# Patient Record
Sex: FEMALE | Race: WHITE | Employment: OTHER | URBAN - METROPOLITAN AREA
[De-identification: names, ages, dates, MRNs, and addresses within clinical notes are randomized per-mention and may not be internally consistent; named-entity substitution may affect disease eponyms.]

---

## 2018-04-03 ENCOUNTER — HOSPITAL ENCOUNTER (OUTPATIENT)
Dept: OTHER | Age: 58
Discharge: HOME | End: 2018-04-03
Attending: NURSE PRACTITIONER | Admitting: NURSE PRACTITIONER

## 2018-04-03 DIAGNOSIS — M25.551 BILATERAL HIP PAIN: ICD-10-CM

## 2018-04-03 DIAGNOSIS — M25.552 BILATERAL HIP PAIN: ICD-10-CM

## 2018-06-19 ENCOUNTER — OFFICE VISIT (OUTPATIENT)
Dept: ORTHOPEDIC SURGERY | Age: 58
End: 2018-06-19

## 2018-06-19 VITALS
HEIGHT: 64 IN | HEART RATE: 81 BPM | BODY MASS INDEX: 36.7 KG/M2 | DIASTOLIC BLOOD PRESSURE: 98 MMHG | SYSTOLIC BLOOD PRESSURE: 104 MMHG | WEIGHT: 215 LBS

## 2018-06-19 DIAGNOSIS — M54.9 BACK ARTHRALGIA: Primary | ICD-10-CM

## 2018-06-19 PROCEDURE — 4004F PT TOBACCO SCREEN RCVD TLK: CPT | Performed by: ORTHOPAEDIC SURGERY

## 2018-06-19 PROCEDURE — 3017F COLORECTAL CA SCREEN DOC REV: CPT | Performed by: ORTHOPAEDIC SURGERY

## 2018-06-19 PROCEDURE — G8427 DOCREV CUR MEDS BY ELIG CLIN: HCPCS | Performed by: ORTHOPAEDIC SURGERY

## 2018-06-19 PROCEDURE — G8417 CALC BMI ABV UP PARAM F/U: HCPCS | Performed by: ORTHOPAEDIC SURGERY

## 2018-06-19 PROCEDURE — 99203 OFFICE O/P NEW LOW 30 MIN: CPT | Performed by: ORTHOPAEDIC SURGERY

## 2018-06-19 RX ORDER — LORATADINE 10 MG/1
10 CAPSULE, LIQUID FILLED ORAL DAILY
COMMUNITY

## 2018-06-19 RX ORDER — DOCUSATE SODIUM 100 MG/1
100 CAPSULE, LIQUID FILLED ORAL NIGHTLY
COMMUNITY

## 2018-06-19 RX ORDER — LISINOPRIL AND HYDROCHLOROTHIAZIDE 12.5; 1 MG/1; MG/1
1 TABLET ORAL DAILY
COMMUNITY
End: 2021-02-10

## 2018-06-19 RX ORDER — ATORVASTATIN CALCIUM 40 MG/1
40 TABLET, FILM COATED ORAL NIGHTLY
COMMUNITY

## 2018-06-19 RX ORDER — CYCLOBENZAPRINE HCL 10 MG
10 TABLET ORAL 3 TIMES DAILY PRN
Qty: 60 TABLET | Refills: 0 | Status: SHIPPED | OUTPATIENT
Start: 2018-06-19 | End: 2018-06-29

## 2018-06-28 ENCOUNTER — HOSPITAL ENCOUNTER (OUTPATIENT)
Dept: PHYSICAL THERAPY | Age: 58
Discharge: HOME | End: 2018-06-30
Admitting: ORTHOPAEDIC SURGERY

## 2018-06-28 NOTE — FLOWSHEET NOTE
Therapy:   n/a    Therapeutic Activity:   n/a     Gait: see eval    Neuromuscular Re-Education:n/a      Canalith Repositioning Procedure:n/a    Manual Therapy: x 15 minutes  Shotgun technique x 5 reps x 5 second hold  MET R sacral fixation in sidelying x 5 reps x 5 second hold  Manual traction    Modalities:  n/a    Functional Outcome Measure:   Date recorded: 6/28/2018  Measure used: Mod oswestry  Score:  21/50  = 42%   Disability:  42%       Assessment/Treatment/Activity Tolerance:   Good tolerance to manual treatment and there-ex. GCode:  Changing and Maintaining Body Position Current Status (): At least 40 percent but less than 60 percent impaired, limited or restricted  Patients response to treatment:   [x] Patient tolerated treatment well [] Patient limited by fatigue   [] Patient limited by pain [] Patient limited by other medical complications   [] Other:     Goals:   Progress towards goals:  n/a  GCode:   Changing and Maintaining Body Position Goal Status (): At least 20 percent but less than 40 percent impaired, limited or restricted  Short Term Goals (  2-3  weeks) Long Term Goals ( 4-6  weeks)   1). Establish HEP 1). Independent with HEP   2). Decrease pain 0-5/10  2). Decrease pain to 0-3/10 with centralization of pain to low back   3). Present to clinic for 2 consecutive sessions with neutral pelvic-sacral alignment 3). Tolerate sitting for > 1 hr without significant increase in pain   4). 4). Improve mod oswestry <20% disability   5). 5).   6). 6).          Prognosis: [x] Good [] Fair  [] Poor    Patient Requires Follow-up:  [x] Yes  [] No    Plan: [] Continue per plan of care [] Alter current plan (see comments)   [x] Plan of care initiated [] Hold pending MD visit [] Discharge    Timed Code Treatment Minutes:  38    Total Treatment Minutes:  68      Electronically signed by:  Vazquez Tucker, [id number]
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
## 2018-07-01 ENCOUNTER — HOSPITAL ENCOUNTER (OUTPATIENT)
Dept: OTHER | Age: 58
Discharge: HOME | End: 2018-07-01
Attending: ORTHOPAEDIC SURGERY | Admitting: ORTHOPAEDIC SURGERY

## 2018-07-02 ENCOUNTER — HOSPITAL ENCOUNTER (OUTPATIENT)
Dept: PHYSICAL THERAPY | Age: 58
Discharge: HOME | End: 2018-07-03

## 2018-07-02 NOTE — FLOWSHEET NOTE
Outpatient Physical Therapy     [x] Daily Treatment Note   [] Progress Note   [] Discharge Note    Date:  7/2/2018    Patient Name:  Cristhian Pena        YOB: 1960    Medical Diagnosis:  Back arthralgia        ICD 10:  M54.9    Treatment Diagnosis:  Sacral fixation, decreased flexibility, hip weakness, gait abnormality, postural dysfunction    Onset Date:  2016      Referral Date:6/19/2018     Referring Physician:  Dr. Catarino Estrada MD        Visits Allowed/Insurance/Certification Information:  Abby Sanchez    Restrictions/Precautions:  No specific     Plan of care signed (Y/N):   n/a    Progress Note covers period from (if applicable):    [x]  NA    [] From          To           Next Progress Note due:   7/19/2018    Visit# / total visits:  2/8-12    Plan for Next Session:  Re-assess pelvic-sacral dysfunction with manual techniques as needed, manual techniques for thoracic spine as needed, postural stretching, flexion/extension response, piriformis stretches, balance exercises, hip strengthening, decompression techniques    Subjective:  Pain remains unchanged  Plans to start back on lipitor tomorrow, tart cherry and tomaric tomorrow  Reports transitional sideways movements cause more pain  Exercises going well however with increased difficulty with hamstring stretch     Pain level: 4/10 walking into clinic, 6-7/10 walking dog this morning    AT EVAL: Patient reports 3-4 /10 pain at rest, 6-7 /10 pain at worse     Objective:       Exercises:    Exercises in bold performed in department today. Items not bolded are carried forward from prior visits for continuity of the record.   Exercise/Equipment Resistance/Repetitions Other comments     Knee rocks X 10 reps Handout given, increase to 3x10 as tolerated     BKTC X 10 reps with 10 second hold \"     Hamstring stretch X 30 seconds x 3 Altered method to sitting EOB     Glut set X 10 reps with 5 second hold \"     bridges 1x 10 reps Handout given                                                                                                          Therapeutic Exercise/Home Exercise Program:  X 15minutes. See above, HEP reviewed and reviewed as above    Group Therapy:   n/a    Therapeutic Activity:   n/a     Gait: see eval    Neuromuscular Re-Education:n/a      Canalith Repositioning Procedure:n/a    Manual Therapy: x 15 minutes  (+) sacral thrusts  (+) sacral sulci test with sidebend  Shotgun technique x 5 reps x 5 second hold  MET R sacral fixation in sidelying (L) x 5 reps x 5 second hold (resisted hip abduction with lumbosacral rotation)   MET R sacral fixation in sidelying (R) x 5 reps x 5 second hold (resisted hip adduction)  Manual traction    Modalities:  n/a    Functional Outcome Measure:   Date recorded: 6/28/2018  Measure used: Mod oswestry  Score:  21/50  = 42%   Disability:  42%       Assessment/Treatment/Activity Tolerance:   Good tolerance to manual treatment and there-ex. GCode:  Changing and Maintaining Body Position Current Status (): At least 40 percent but less than 60 percent impaired, limited or restricted  Patients response to treatment:   [x] Patient tolerated treatment well [] Patient limited by fatigue   [] Patient limited by pain [] Patient limited by other medical complications   [] Other:     Goals:   Progress towards goals:  n/a  GCode:   Changing and Maintaining Body Position Goal Status (): At least 20 percent but less than 40 percent impaired, limited or restricted  Short Term Goals (  2-3  weeks) Long Term Goals ( 4-6  weeks)   1). Establish HEP 1). Independent with HEP   2). Decrease pain 0-5/10  2). Decrease pain to 0-3/10 with centralization of pain to low back   3). Present to clinic for 2 consecutive sessions with neutral pelvic-sacral alignment 3). Tolerate sitting for > 1 hr without significant increase in pain   4). 4). Improve mod oswestry <20% disability   5).  5).   6). 6).

## 2018-07-05 ENCOUNTER — HOSPITAL ENCOUNTER (OUTPATIENT)
Dept: PHYSICAL THERAPY | Age: 58
Discharge: HOME | End: 2018-07-06

## 2018-07-09 ENCOUNTER — HOSPITAL ENCOUNTER (OUTPATIENT)
Dept: PHYSICAL THERAPY | Age: 58
Discharge: HOME | End: 2018-07-10

## 2018-07-09 NOTE — FLOWSHEET NOTE
Outpatient Physical Therapy     [x] Daily Treatment Note   [] Progress Note   [] Discharge Note    Date:  7/9/2018    Patient Name:  Taisha Mccollum        YOB: 1960    Medical Diagnosis:  Back arthralgia        ICD 10:  M54.9    Treatment Diagnosis:  Sacral fixation, decreased flexibility, hip weakness, gait abnormality, postural dysfunction    Onset Date:  2016      Referral Date:6/19/2018     Referring Physician:  Dr. Chloe Simpson MD        Visits Allowed/Insurance/Certification Information:  Franklin Wood Adv    Restrictions/Precautions:  No specific     Plan of care signed (Y/N):   n/a    Progress Note covers period from (if applicable):    [x]  NA    [] From          To           Next Progress Note due:   7/19/2018    Visit# / total visits:  4/8-12    Plan for Next Session:  Re-assess pelvic-sacral dysfunction with manual techniques as needed, manual techniques for thoracic spine as needed, postural stretching, flexion/extension response, piriformis stretches, balance exercises, hip strengthening, decompression techniques    Subjective:  Pt reports no significant increase or decrease in pain immediately after last session  However yesterday was in pool with more activity. Noted significantly increase pain when attempting to get up   Continued increased pain over last 24 hr with rating as high of 9/20 when walking dog this morning  Pt reports intermittent paraesthesias through RUE to elbow \" a couple times\"  Pt noted tremors 2-3 months in L hand  Noted some increased imbalance, knocking into furniture especially when hurrying to bathroom at night   Pt reports improved ability to lay supine at night (prior to recent flareup over last 24 hrs)    Pain level: 7/10 walking into clinic, 9/10 walking dog this morning , 6/10 at end of session  AT [facility]: Patient reports 3-4 /10 pain at rest, 6-7 /10 pain at worse     Objective:       Exercises:    Exercises in bold performed in department today.

## 2018-07-12 ENCOUNTER — HOSPITAL ENCOUNTER (OUTPATIENT)
Dept: PHYSICAL THERAPY | Age: 58
Discharge: HOME | End: 2018-07-13

## 2018-07-12 NOTE — FLOWSHEET NOTE
Outpatient Physical Therapy     [x] Daily Treatment Note   [] Progress Note   [] Discharge Note    Date:  7/12/2018    Patient Name:  Nithya Bobby        YOB: 1960    Medical Diagnosis:  Back arthralgia        ICD 10:  M54.9    Treatment Diagnosis:  Sacral fixation, decreased flexibility, hip weakness, gait abnormality, postural dysfunction    Onset Date:  2016      Referral Date:6/19/2018     Referring Physician:  Dr. Kel Mosquera MD        Visits Allowed/Insurance/Certification Information:  Massimo Kumar Adv    Restrictions/Precautions:  No specific     Plan of care signed (Y/N):   n/a    Progress Note covers period from (if applicable):    [x]  NA    [] From          To           Next Progress Note due:   7/19/2018    Visit# / total visits:  5/8-12    Plan for Next Session:  Re-assess pelvic-sacral dysfunction with manual techniques as needed, manual techniques for thoracic spine as needed, postural stretching, flexion/extension response, piriformis stretches, balance exercises, hip strengthening, decompression techniques    Subjective:  Pt reports she is better today than on Monday. Report prone prop does seem to help    Pain level: 4-5/10 walking into clinic, 6-7/10 walking dog this morning,   6/10 at end of session  AT [facility]: Patient reports 3-4 /10 pain at rest, 6-7 /10 pain at worse     Objective:       Exercises:    Exercises in bold performed in department today. Items not bolded are carried forward from prior visits for continuity of the record.   Exercise/Equipment Resistance/Repetitions Other comments   Knee rocks X 10 reps Handout given, increase to 3x10 as tolerated   BKTC X 10 reps with 10 second hold \"   Hamstring stretch X 30 seconds x 3 Pt reports she was inst to stop this ex  Altered method to sitting EOB   Glut set 2x10 reps with 5-10 second hold \"   TA 2x10    bridges 2x10 reps Handout given   Seated piriformis stretch 30 sec x 3 wang    Shotgun ex 5x5 sec Discontinued

## 2018-07-16 ENCOUNTER — HOSPITAL ENCOUNTER (OUTPATIENT)
Dept: PHYSICAL THERAPY | Age: 58
Setting detail: THERAPIES SERIES
Discharge: HOME | End: 2018-07-16
Payer: MEDICARE

## 2018-07-16 PROCEDURE — 97110 THERAPEUTIC EXERCISES: CPT

## 2018-07-16 PROCEDURE — 97140 MANUAL THERAPY 1/> REGIONS: CPT

## 2018-07-19 ENCOUNTER — APPOINTMENT (OUTPATIENT)
Dept: PHYSICAL THERAPY | Age: 58
End: 2018-07-19
Payer: MEDICARE

## 2018-07-19 ENCOUNTER — HOSPITAL ENCOUNTER (OUTPATIENT)
Dept: PHYSICAL THERAPY | Age: 58
Setting detail: THERAPIES SERIES
Discharge: HOME | End: 2018-07-19
Payer: MEDICARE

## 2018-07-19 PROCEDURE — 97140 MANUAL THERAPY 1/> REGIONS: CPT

## 2018-07-19 NOTE — FLOWSHEET NOTE
Outpatient Physical Therapy     [x] Daily Treatment Note   [] Progress Note   [] Discharge Note    Date:  7/19/2018    Patient Name:  Marina Wilson        YOB: 1960    Medical Diagnosis:  Back arthralgia        ICD 10:  M54.9    Treatment Diagnosis:  Sacral fixation, decreased flexibility, hip weakness, gait abnormality, postural dysfunction    Onset Date:  2016      Referral Date:6/19/2018     Referring Physician:  Dr. Patsy Pollack MD        Visits Allowed/Insurance/Certification Information:  Glenna Carbo Adv    Restrictions/Precautions:  No specific     Plan of care signed (Y/N):   n/a    Progress Note covers period from (if applicable):    [x]  NA    [] From          To           Next Progress Note due:   7/19/2018    Visit# / total visits:  7/8-12    Plan for Next Session:  Re-assess pelvic-sacral dysfunction with manual techniques as needed, manual techniques for thoracic spine as needed, postural stretching, flexion/extension response, piriformis stretches, balance exercises, hip strengthening, decompression techniques    Subjective:  Pt reports she is feeling much better since last session  Continues with pain with walking dogs  Has been performing exercises every day  No big flareups  Able to stand better with 1 smooth movement with no catching  Pt does state that she has noticed she has been able to sit for longer periods of time without discomfort (from 30 minutes>1 hour)    Pain level: 4-5/10 walking into clinic, 6-8/10 since last appointment,   3/10 at end of session  AT [facility]: Patient reports 3-4 /10 pain at rest, 6-7 /10 pain at worse      Objective:       Exercises:    Exercises in bold performed in department today. Items not bolded are carried forward from prior visits for continuity of the record.   Exercise/Equipment Resistance/Repetitions Other comments   Knee rocks X 10 reps Handout given, increase to 3x10 as tolerated   BKTC X 10 reps with 10 second hold Increase to 2x fatigue   [] Patient limited by pain [] Patient limited by other medical complications   [] Other:     Goals:   Progress towards goals:  n/a  GCode:   Changing and Maintaining Body Position Goal Status (): At least 20 percent but less than 40 percent impaired, limited or restricted  Short Term Goals (  2-3  weeks) Long Term Goals ( 4-6  weeks)   1). Establish HEP 1). Independent with HEP   2). Decrease pain 0-5/10  2). Decrease pain to 0-3/10 with centralization of pain to low back   3). Present to clinic for 2 consecutive sessions with neutral pelvic-sacral alignment 3). Tolerate sitting for > 1 hr without significant increase in pain   4). 4). Improve mod oswestry <20% disability   5). 5).   6). 6).          Prognosis: [x] Good [] Fair  [] Poor    Patient Requires Follow-up:  [x] Yes  [] No    Plan: [x] Continue per plan of care [] Alter current plan (see comments)   [] Plan of care initiated [] Hold pending MD visit [] Discharge    Timed Code Treatment Minutes: 30    Total Treatment Minutes:  30      Electronically signed by:  Liu Vargas [id number]

## 2018-07-23 ENCOUNTER — HOSPITAL ENCOUNTER (OUTPATIENT)
Dept: PHYSICAL THERAPY | Age: 58
Setting detail: THERAPIES SERIES
Discharge: HOME | End: 2018-07-23
Payer: MEDICARE

## 2018-07-23 PROCEDURE — 97140 MANUAL THERAPY 1/> REGIONS: CPT

## 2018-07-23 PROCEDURE — 97012 MECHANICAL TRACTION THERAPY: CPT

## 2018-07-23 PROCEDURE — 97110 THERAPEUTIC EXERCISES: CPT

## 2018-07-23 NOTE — FLOWSHEET NOTE
BKTC X 10 reps with 10 second hold Increase to 2x 10 if pain controlled   Hamstring stretch X 30 seconds x 3 Altered method to sitting EOB   Glut set 2x10 reps with 5-10 second hold \"increase to 3x10 if pain tolerable   TA x10 w/ 5 sec hold    bridges 2x10 reps Deferred until thursday   Seated piriformis stretch 30 sec x 3 wang    Shotgun ex 5x5 sec Discontinued due to increased symptoms since last visit. Prone prop to elbows 2x10 Twice a day at home; progress to 3x10 if pain controlled after visit   Hip abduction standing 1x10  Resume at home as able   Minisquats/ sit back style 2x10 To incr to 3x5-10; resumed today                                                                     Therapeutic Exercise/Home Exercise Program:  [id number]. See above, HEP reviewed and reviewed as above    Group Therapy:   n/a    Therapeutic Activity:   n/a     Gait:   Ambulating without device with trendelenburg pattern with equal stride length bilaterally    Neuromuscular Re-Education:n/a      Canalith Repositioning Procedure:n/a    Manual Therapy:total 25 min    Pelvic sacral re-assessment:  Standing:   Standing flexion test (-)  Standing sulcus test: sidebend L decreased 50-75% and painful, sidebend R decreased 25-50% and painful: decreased sacral movement  Standing R sacral sulcus deep    Sitting: pelvic landmarks level B    Supine:  90/90 (-)  Provocation tests (-)  Thigh thrust (-)    Prone  Prone knee bend(-)  Sacral thrusts (+) R base            Modalities:  X 15 minutes  Mechanical traction trial: lumbar x 15 minutes 50 lb max hold x 45 sec, 25 lb min hold x 15 sec      Functional Outcome Measure:   Date recorded: 6/28/2018  Measure used: Mod oswestry  Score:  21/50  = 42%   Disability:  42%        Assessment/Treatment/Activity Tolerance:     GCode:  Changing and Maintaining Body Position Current Status ():  At least 40 percent but less than 60 percent impaired, limited or restricted  Patients response to

## 2018-07-26 ENCOUNTER — HOSPITAL ENCOUNTER (OUTPATIENT)
Dept: PHYSICAL THERAPY | Age: 58
Setting detail: THERAPIES SERIES
Discharge: HOME | End: 2018-07-26
Payer: MEDICARE

## 2018-07-26 PROCEDURE — 97110 THERAPEUTIC EXERCISES: CPT

## 2018-07-26 PROCEDURE — G8981 BODY POS CURRENT STATUS: HCPCS

## 2018-07-26 PROCEDURE — 97140 MANUAL THERAPY 1/> REGIONS: CPT

## 2018-07-26 PROCEDURE — 97012 MECHANICAL TRACTION THERAPY: CPT

## 2018-07-30 ENCOUNTER — HOSPITAL ENCOUNTER (OUTPATIENT)
Dept: PHYSICAL THERAPY | Age: 58
Setting detail: THERAPIES SERIES
Discharge: HOME | End: 2018-07-30
Payer: MEDICARE

## 2018-07-30 PROCEDURE — 97110 THERAPEUTIC EXERCISES: CPT

## 2018-07-30 PROCEDURE — 97012 MECHANICAL TRACTION THERAPY: CPT

## 2018-07-30 PROCEDURE — 97140 MANUAL THERAPY 1/> REGIONS: CPT

## 2018-07-30 NOTE — FLOWSHEET NOTE
Outpatient Physical Therapy     [x] Daily Treatment Note   [] Progress Note   [] Discharge Note    Date:  7/30/2018    Patient Name:  Aliene Crigler        YOB: 1960    Medical Diagnosis:  Back arthralgia        ICD 10:  M54.9    Treatment Diagnosis:  Sacral fixation, decreased flexibility, hip weakness, gait abnormality, postural dysfunction    Onset Date:  2016      Referral Date:6/19/2018     Referring Physician:  Dr. Kaylin Vernon MD        Visits Allowed/Insurance/Certification Information:  Martene Fuelling Adv    Restrictions/Precautions:  No specific     Plan of care signed (Y/N):   n/a    Progress Note covers period from (if applicable):    [x]  NA    [] From  6/28/2018      To     7/26/22018      Next Progress Note due:   AT [place] (see's MD 8/9/2018)    Visit# / total visits:  10/8-12    Plan for Next Session:  Re-assess pelvic-sacral dysfunction with manual techniques as needed, manual techniques for thoracic spine as needed, postural stretching, flexion/extension response, piriformis stretches, balance exercises, hip strengthening, decompression techniques    Subjective:  Some increased sore through neck and R shoulder w/ exercise of alt UEs  Did do some swimming and laundry over weekend  Mild R lateral radicular pain   Feels today is a \"crappy day\" otherwise prior to today feeling about the same  Cont to feel traction is helping      Pt does state that she has noticed she has been able to sit for longer periods of time without discomfort (from 30 minutes>1 hour)     Pain level:   5/10 walking into clinic, transitional movements 7-8/10 and with walk; 3-4/10 by end of session  AT [facility]: Patient reports 3-4 /10 pain at rest, 6-7 /10 pain at worse      Objective:       Exercises:    Exercises in bold performed in department today. Items not bolded are carried forward from prior visits for continuity of the record.   Exercise/Equipment Resistance/Repetitions Other comments   nustep Level 4 x 5 oswestry  Score:  22/50  = 44%   Disability: 44%        Assessment/Treatment/Activity Tolerance:     GCode:  Changing and Maintaining Body Position Current Status (): At least 40 percent but less than 60 percent impaired, limited or restricted  Patients response to treatment:   [x] Patient tolerated treatment well [x] Patient limited by fatigue   [] Patient limited by pain [] Patient limited by other medical complications   [] Other:     Goals:   Progress towards goals:  As of 7/26/2018 pt has met 1/3 STGs and 1/4 STGs  GCode:   Changing and Maintaining Body Position Goal Status (): At least 20 percent but less than 40 percent impaired, limited or restricted  Short Term Goals (  2-3  weeks) Long Term Goals ( 4-6  weeks)   1). Establish HEP - MET 1). Independent with HEP   2). Decrease pain 0-5/10  - NOT MET 2). Decrease pain to 0-3/10 with centralization of pain to low back   3). Present to clinic for 2 consecutive sessions with neutral pelvic-sacral alignment  - partially met (pelvis aligned however sacral fixation) 3). Tolerate sitting for > 1 hr without significant increase in pain - MET   4). 4). Improve mod oswestry <20% disability    5). 5).   6). 6).          Prognosis: [x] Good [] Fair  [] Poor    Patient Requires Follow-up:  [x] Yes  [] No    Plan: [x] Continue per plan of care [] Alter current plan (see comments)   [] Plan of care initiated [] Hold pending MD visit [] Discharge    Timed Code Treatment Minutes: 30  Total Treatment Minutes:  50      Electronically signed by:  Modesto Maki [id number]

## 2018-08-02 ENCOUNTER — HOSPITAL ENCOUNTER (OUTPATIENT)
Dept: PHYSICAL THERAPY | Age: 58
Setting detail: THERAPIES SERIES
Discharge: HOME | End: 2018-08-02
Payer: MEDICARE

## 2018-08-02 PROCEDURE — 97012 MECHANICAL TRACTION THERAPY: CPT

## 2018-08-02 PROCEDURE — G0283 ELEC STIM OTHER THAN WOUND: HCPCS

## 2018-08-02 PROCEDURE — 97110 THERAPEUTIC EXERCISES: CPT

## 2018-08-02 PROCEDURE — 97112 NEUROMUSCULAR REEDUCATION: CPT

## 2018-08-02 NOTE — FLOWSHEET NOTE
sec, 25 lb min hold x 15 sec  Good response to treatment with reduction in symptoms    Functional Outcome Measure:   Date recorded:7/26/2018  Measure used: Mod oswestry  Score:  22/50  = 44%   Disability: 44%        Assessment/Treatment/Activity Tolerance:     GCode:  Changing and Maintaining Body Position Current Status (): At least 40 percent but less than 60 percent impaired, limited or restricted  Patients response to treatment:   [x] Patient tolerated treatment well [x] Patient limited by fatigue   [] Patient limited by pain [] Patient limited by other medical complications   [] Other:     Goals:   Progress towards goals:  As of 7/26/2018 pt has met 1/3 STGs and 1/4 STGs  GCode:   Changing and Maintaining Body Position Goal Status (): At least 20 percent but less than 40 percent impaired, limited or restricted  Short Term Goals (  2-3  weeks) Long Term Goals ( 4-6  weeks)   1). Establish HEP - MET 1). Independent with HEP   2). Decrease pain 0-5/10  - NOT MET 2). Decrease pain to 0-3/10 with centralization of pain to low back   3). Present to clinic for 2 consecutive sessions with neutral pelvic-sacral alignment  - partially met (pelvis aligned however sacral fixation) 3). Tolerate sitting for > 1 hr without significant increase in pain - MET   4). 4). Improve mod oswestry <20% disability    5). 5).   6). 6).          Prognosis: [x] Good [] Fair  [] Poor    Patient Requires Follow-up:  [x] Yes  [] No    Plan: [x] Continue per plan of care [] Alter current plan (see comments)   [] Plan of care initiated [] Hold pending MD visit [] Discharge    Timed Code Treatment Minutes: 35  Total Treatment Minutes:  55      Electronically signed by:  Danni Santiago, [id number]

## 2018-08-06 ENCOUNTER — APPOINTMENT (OUTPATIENT)
Dept: PHYSICAL THERAPY | Age: 58
End: 2018-08-06
Payer: MEDICARE

## 2018-08-08 ENCOUNTER — HOSPITAL ENCOUNTER (OUTPATIENT)
Dept: PHYSICAL THERAPY | Age: 58
Setting detail: THERAPIES SERIES
Discharge: HOME | End: 2018-08-08
Payer: MEDICARE

## 2018-08-08 ENCOUNTER — TELEPHONE (OUTPATIENT)
Dept: ORTHOPEDIC SURGERY | Age: 58
End: 2018-08-08

## 2018-08-08 PROCEDURE — 97012 MECHANICAL TRACTION THERAPY: CPT

## 2018-08-08 PROCEDURE — G8981 BODY POS CURRENT STATUS: HCPCS

## 2018-08-08 PROCEDURE — 97110 THERAPEUTIC EXERCISES: CPT

## 2018-08-08 PROCEDURE — 97112 NEUROMUSCULAR REEDUCATION: CPT

## 2018-08-08 PROCEDURE — G8983 BODY POS D/C STATUS: HCPCS

## 2018-08-08 NOTE — PROGRESS NOTES
Program:  [id number]. See above, HEP reviewed and reviewed as above  Educated on continued progression of all exercises with focus of core stabilization and neutral spine for all exercises    Group Therapy:   n/a    Therapeutic Activity:   n/a     Gait:   Ambulating without device with trendelenburg pattern with equal stride length bilaterally    Neuromuscular Re-Education: x 10 minutes  Educated on progression of balance exercises  See above      Canalith Repositioning Procedure:n/a    Manual Therapy:total 0 min      Pelvic sacral re-assessment:   (+) Blue's sign on R    Standing:  Iliac crest even B  PSIS even B  R sacral base slightly prominent    Supine:      Prone              Modalities:  X 20 minutes  Mechanical traction: lumbar x 20 minutes 50 lb max hold x 45 sec, 25 lb min hold x 15 sec  Good response to treatment with reduction in symptoms    Functional Outcome Measure:   Date recorded:8/8/2018  Measure used: Mod oswestry  Score:  21/50  = 42%   Disability: 42%         Assessment/Treatment/Activity Tolerance:    Patient was seen for 12 Visits of PT. Initial eval date 6/28/2018 . Pt partially progressed meeting 1/3 Short Term and 2/4 Long Term Goals of therapy. Did not meet goal for due to continued pain with compressive activities . Plan to DC with recommendation to continue HEP as prepared. GCode:  Changing and Maintaining Body Position Current Status (): At least 40 percent but less than 60 percent impaired, limited or restricted  Patients response to treatment:   [x] Patient tolerated treatment well [x] Patient limited by fatigue   [] Patient limited by pain [] Patient limited by other medical complications   [] Other:     Goals:   Progress towards goals:  As of 8/8/2018 pt has met 1/3 STGs and 2/4 STGs  GCode:   Changing and Maintaining Body Position Goal Status ():  At least 20 percent but less than 40 percent impaired, limited or restricted  Short Term Goals (  2-3  weeks) Long

## 2018-08-09 ENCOUNTER — OFFICE VISIT (OUTPATIENT)
Dept: ORTHOPEDIC SURGERY | Age: 58
End: 2018-08-09

## 2018-08-09 VITALS
HEART RATE: 85 BPM | DIASTOLIC BLOOD PRESSURE: 42 MMHG | HEIGHT: 64 IN | WEIGHT: 215 LBS | SYSTOLIC BLOOD PRESSURE: 90 MMHG | BODY MASS INDEX: 36.7 KG/M2

## 2018-08-09 DIAGNOSIS — M54.9 BACK ARTHRALGIA: Primary | ICD-10-CM

## 2018-08-09 PROCEDURE — 99214 OFFICE O/P EST MOD 30 MIN: CPT | Performed by: ORTHOPAEDIC SURGERY

## 2018-08-09 PROCEDURE — 4004F PT TOBACCO SCREEN RCVD TLK: CPT | Performed by: ORTHOPAEDIC SURGERY

## 2018-08-09 PROCEDURE — G8427 DOCREV CUR MEDS BY ELIG CLIN: HCPCS | Performed by: ORTHOPAEDIC SURGERY

## 2018-08-09 PROCEDURE — 3017F COLORECTAL CA SCREEN DOC REV: CPT | Performed by: ORTHOPAEDIC SURGERY

## 2018-08-09 PROCEDURE — G8417 CALC BMI ABV UP PARAM F/U: HCPCS | Performed by: ORTHOPAEDIC SURGERY

## 2018-08-09 ASSESSMENT — ENCOUNTER SYMPTOMS
ABDOMINAL PAIN: 0
BLURRED VISION: 0
CONSTIPATION: 0
DIARRHEA: 0
NAUSEA: 0
WHEEZING: 0
COUGH: 0
SORE THROAT: 0
VOMITING: 0
HEARTBURN: 0
DOUBLE VISION: 0
SHORTNESS OF BREATH: 0

## 2018-08-09 NOTE — PROGRESS NOTES
Date:  2018    Name:  All Perez    :  1960      Age:   59 y.o. Reason for Visit:     Hip Pain (Bilateral hip)      HPI: All Perez is a 59 y.o. female here today for evaluation of low back and buttocks pain. She states she is been evaluated multiple times for her hips and been treated with physical therapy and over-the-counter supplementation with minimal help. She says she does not believe it is related to her hips she feels that the pain is coming more from her low back and buttocks area states that she gets a clenching pain that runs down the outer posterior side of both legs worse on the right. Denies numbness burning tingling or weakness in the legs. In her previous visit. She was instructed to stop her Lipitor which she notes made no difference also instructed to use supplementation and a muscle relaxer that did not help. She was also performing physical therapy which she did note mild improvement in her ability to walk and improvement with some flexibility. She denies use of anti-inflammatories due to poor diabetic control. She rates her pain at a minimum of 4 out of 10 reaching a maximum of 8 out of 10 increases in pain with twisting motions especially getting in and out of a car and long periods of walking. Her main interest today is further evaluating the cause for her pain. She is less concerned about medications she is able to manage her pain. Review of Systems:  Review of Systems   Constitutional: Negative for chills, fever, malaise/fatigue and weight loss. HENT: Negative for ear discharge, ear pain, hearing loss, nosebleeds and sore throat. Eyes: Negative for blurred vision and double vision. Respiratory: Negative for cough, shortness of breath and wheezing. Cardiovascular: Negative for chest pain and palpitations. Gastrointestinal: Negative for abdominal pain, constipation, diarrhea, heartburn, nausea and vomiting.    Genitourinary: Negative for dysuria, frequency and urgency. Skin: Negative for itching and rash. Neurological: Negative for dizziness and headaches. Psychiatric/Behavioral: Negative for depression. Past History:  Past Medical History:   Diagnosis Date    High blood pressure     Hyperlipidemia      Past Surgical History:   Procedure Laterality Date    ANKLE FRACTURE SURGERY      KNEE SURGERY       Current Outpatient Prescriptions on File Prior to Visit   Medication Sig Dispense Refill    SITagliptin (JANUVIA) 100 MG tablet Take 100 mg by mouth daily      dapagliflozin (FARXIGA) 5 MG tablet Take 5 mg by mouth every morning      lisinopril-hydrochlorothiazide (PRINZIDE;ZESTORETIC) 10-12.5 MG per tablet Take 1 tablet by mouth daily      vitamin B-12 (CYANOCOBALAMIN) 250 MCG tablet Take 250 mcg by mouth daily      Cholecalciferol (VITAMIN D3) 5000 units TABS Take by mouth      atorvastatin (LIPITOR) 40 MG tablet Take 40 mg by mouth daily      Insulin Glargine (BASAGLAR KWIKPEN SC) Inject into the skin      aspirin 81 MG tablet Take 81 mg by mouth daily      loratadine (CLARITIN) 10 MG capsule Take 10 mg by mouth daily      docusate sodium (COLACE) 100 MG capsule Take 100 mg by mouth 2 times daily       No current facility-administered medications on file prior to visit. Social History     Social History    Marital status:      Spouse name: N/A    Number of children: N/A    Years of education: N/A     Occupational History    Not on file. Social History Main Topics    Smoking status: Current Every Day Smoker     Packs/day: 1.00     Types: Cigarettes    Smokeless tobacco: Never Used    Alcohol use Not on file    Drug use: Unknown    Sexual activity: Not on file     Other Topics Concern    Not on file     Social History Narrative    No narrative on file     No family history on file. Allergies:   Allergies   Allergen Reactions    Metformin And Related        Physical Exam:  Vitals: 08/09/18 1122   BP: (!) 90/42   Pulse: 85     General: Norman Carlos is a healthy and well appearing 59y.o. year old female who is sitting comfortably in our office in noacute distress. Alert and oriented. Physical Exam:  Back Exam     Tenderness   The patient is experiencing tenderness in the sacroiliac and lumbar. Range of Motion   Extension: normal   Flexion: normal   Lateral Bend Right: normal   Lateral Bend Left: normal   Rotation Right: normal   Rotation Left: normal     Muscle Strength   The patient has normal back strength. Tests   Straight leg raise right: negative  Straight leg raise left: negative    Other   Erythema: no back redness  Scars: absent    Comments:  Tightness noted to bilateral hamstrings            Physical Exam   Constitutional: Vital signs are normal. She appears well-developed and well-nourished. No distress. HENT:   Head: Normocephalic and atraumatic. Eyes: EOM are normal. Right eye exhibits no discharge. Left eye exhibits no discharge. No scleral icterus. Cardiovascular: Normal rate and regular rhythm. Pulmonary/Chest: Breath sounds normal. No respiratory distress. Neurological: She is alert. Skin: Skin is warm and dry. No rash noted. She is not diaphoretic. No erythema. Nails show no clubbing. Psychiatric: She has a normal mood and affect. Her behavior is normal.     xrays 4 view of the lumbar and thoracic spine with noted area of lumbar hyperlordosis and upper lumbar djd extending to thoracolumbar fusion at multiple levels. Disc spaces distal lumbar relatively well preserved . Grade I listhesis of L4 on L5  Diagnostics:  MRI LUMBAR SPINE WO CONTRAST    (Results Pending)       Assessment/Plan:   1. Back arthralgia  -Advised the patient to continue physical therapy but to let physical therapist now to focus more and lumbar and sacroiliac region. As well as core strengthening and hamstring loosening.  - XR LUMBAR SPINE (MIN 4 VIEWS);  Future   4 views of the lumbar spine noted fusion of T10 through L1, mild osteoarthritic changes in the lumbar spine. Slight anterior shifting of L4 vertebral body compared to L5 vertebral body. No signs of fracture or dislocation no abnormalities noted in the SI joint and pelvis. - MRI LUMBAR SPINE WO CONTRAST; Future  -discuss potential for therapeutic and diagnostic injections into the lumbar sacroiliac region dependent upon MRI results. -Advised to schedule follow-up appointment after MRI is performed    Progression with therapy weight loss. Plan for additional treatmtnet based upon the mri evlaution.      Electronically signed by GOLD Olsen on 8/9/2018 at 4:52 PM

## 2018-08-27 ENCOUNTER — HOSPITAL ENCOUNTER (OUTPATIENT)
Dept: MRI IMAGING | Age: 58
Discharge: HOME | End: 2018-08-27
Payer: MEDICARE

## 2018-08-27 DIAGNOSIS — M54.9 BACK ARTHRALGIA: ICD-10-CM

## 2018-08-27 PROCEDURE — 72148 MRI LUMBAR SPINE W/O DYE: CPT

## 2018-09-06 ENCOUNTER — OFFICE VISIT (OUTPATIENT)
Dept: ORTHOPEDIC SURGERY | Age: 58
End: 2018-09-06

## 2018-09-06 VITALS
WEIGHT: 215 LBS | HEART RATE: 82 BPM | BODY MASS INDEX: 36.7 KG/M2 | HEIGHT: 64 IN | SYSTOLIC BLOOD PRESSURE: 132 MMHG | DIASTOLIC BLOOD PRESSURE: 77 MMHG

## 2018-09-06 DIAGNOSIS — M54.9 BACK ARTHRALGIA: Primary | ICD-10-CM

## 2018-09-06 PROCEDURE — G8427 DOCREV CUR MEDS BY ELIG CLIN: HCPCS | Performed by: ORTHOPAEDIC SURGERY

## 2018-09-06 PROCEDURE — G8417 CALC BMI ABV UP PARAM F/U: HCPCS | Performed by: ORTHOPAEDIC SURGERY

## 2018-09-06 PROCEDURE — 4004F PT TOBACCO SCREEN RCVD TLK: CPT | Performed by: ORTHOPAEDIC SURGERY

## 2018-09-06 PROCEDURE — 3017F COLORECTAL CA SCREEN DOC REV: CPT | Performed by: ORTHOPAEDIC SURGERY

## 2018-09-06 PROCEDURE — 99213 OFFICE O/P EST LOW 20 MIN: CPT | Performed by: ORTHOPAEDIC SURGERY

## 2018-09-06 RX ORDER — LANOLIN ALCOHOL/MO/W.PET/CERES
400 CREAM (GRAM) TOPICAL DAILY
COMMUNITY
End: 2019-03-20 | Stop reason: ALTCHOICE

## 2018-09-06 ASSESSMENT — ENCOUNTER SYMPTOMS
SORE THROAT: 0
CONSTIPATION: 0
SHORTNESS OF BREATH: 0
HEARTBURN: 0
BLURRED VISION: 0
VOMITING: 0
ABDOMINAL PAIN: 0
NAUSEA: 0
COUGH: 0
DOUBLE VISION: 0
WHEEZING: 0
DIARRHEA: 0

## 2018-09-06 NOTE — PROGRESS NOTES
Negative for dysuria, frequency and urgency. Skin: Negative for itching and rash. Neurological: Negative for dizziness and headaches. Psychiatric/Behavioral: Negative for depression. Past History:  Past Medical History:   Diagnosis Date    High blood pressure     Hyperlipidemia      Past Surgical History:   Procedure Laterality Date    ANKLE FRACTURE SURGERY      KNEE SURGERY       Current Outpatient Prescriptions on File Prior to Visit   Medication Sig Dispense Refill    SITagliptin (JANUVIA) 100 MG tablet Take 100 mg by mouth daily      dapagliflozin (FARXIGA) 5 MG tablet Take 5 mg by mouth every morning      lisinopril-hydrochlorothiazide (PRINZIDE;ZESTORETIC) 10-12.5 MG per tablet Take 1 tablet by mouth daily      vitamin B-12 (CYANOCOBALAMIN) 250 MCG tablet Take 250 mcg by mouth daily      Cholecalciferol (VITAMIN D3) 5000 units TABS Take by mouth      atorvastatin (LIPITOR) 40 MG tablet Take 40 mg by mouth daily      Insulin Glargine (BASAGLAR KWIKPEN SC) Inject into the skin      aspirin 81 MG tablet Take 81 mg by mouth daily      loratadine (CLARITIN) 10 MG capsule Take 10 mg by mouth daily      docusate sodium (COLACE) 100 MG capsule Take 100 mg by mouth 2 times daily       No current facility-administered medications on file prior to visit. Social History     Social History    Marital status:      Spouse name: N/A    Number of children: N/A    Years of education: N/A     Occupational History    Not on file. Social History Main Topics    Smoking status: Current Every Day Smoker     Packs/day: 1.00     Types: Cigarettes    Smokeless tobacco: Never Used    Alcohol use Not on file    Drug use: Unknown    Sexual activity: Not on file     Other Topics Concern    Not on file     Social History Narrative    No narrative on file     No family history on file. Allergies:   Allergies   Allergen Reactions    Metformin And Related        Physical Exam:  There

## 2018-11-06 ENCOUNTER — HOSPITAL ENCOUNTER (OUTPATIENT)
Dept: ULTRASOUND IMAGING | Age: 58
Discharge: HOME | End: 2018-11-06
Payer: MEDICARE

## 2018-11-06 DIAGNOSIS — R79.89 ABNORMAL TSH: ICD-10-CM

## 2018-11-06 PROCEDURE — 76536 US EXAM OF HEAD AND NECK: CPT

## 2019-01-25 ENCOUNTER — HOSPITAL ENCOUNTER (OUTPATIENT)
Age: 59
Discharge: HOME | End: 2019-01-25
Payer: MEDICARE

## 2019-01-25 ENCOUNTER — OFFICE VISIT (OUTPATIENT)
Dept: ENDOCRINOLOGY | Age: 59
End: 2019-01-25
Payer: MEDICARE

## 2019-01-25 VITALS
DIASTOLIC BLOOD PRESSURE: 69 MMHG | WEIGHT: 223 LBS | SYSTOLIC BLOOD PRESSURE: 124 MMHG | BODY MASS INDEX: 39.51 KG/M2 | HEIGHT: 63 IN | HEART RATE: 93 BPM | OXYGEN SATURATION: 98 % | TEMPERATURE: 97.1 F | RESPIRATION RATE: 18 BRPM

## 2019-01-25 DIAGNOSIS — R79.89 ABNORMAL TSH: Primary | ICD-10-CM

## 2019-01-25 DIAGNOSIS — R79.89 ABNORMAL TSH: ICD-10-CM

## 2019-01-25 LAB
T3 FREE: 3.1 PG/ML (ref 2.3–4.2)
T4 FREE: 0.8 NG/DL (ref 0.9–1.8)
THYROID PEROXIDASE (TPO) ABS: 57 IU/ML
TSH SERPL DL<=0.05 MIU/L-ACNC: 9.11 UIU/ML (ref 0.27–4.2)

## 2019-01-25 PROCEDURE — 99243 OFF/OP CNSLTJ NEW/EST LOW 30: CPT | Performed by: INTERNAL MEDICINE

## 2019-01-25 PROCEDURE — 84439 ASSAY OF FREE THYROXINE: CPT

## 2019-01-25 PROCEDURE — 36415 COLL VENOUS BLD VENIPUNCTURE: CPT

## 2019-01-25 PROCEDURE — 84443 ASSAY THYROID STIM HORMONE: CPT

## 2019-01-25 PROCEDURE — G8484 FLU IMMUNIZE NO ADMIN: HCPCS | Performed by: INTERNAL MEDICINE

## 2019-01-25 PROCEDURE — 83520 IMMUNOASSAY QUANT NOS NONAB: CPT

## 2019-01-25 PROCEDURE — 86376 MICROSOMAL ANTIBODY EACH: CPT

## 2019-01-25 PROCEDURE — G8427 DOCREV CUR MEDS BY ELIG CLIN: HCPCS | Performed by: INTERNAL MEDICINE

## 2019-01-25 PROCEDURE — 3017F COLORECTAL CA SCREEN DOC REV: CPT | Performed by: INTERNAL MEDICINE

## 2019-01-25 PROCEDURE — 84481 FREE ASSAY (FT-3): CPT

## 2019-01-25 PROCEDURE — G8417 CALC BMI ABV UP PARAM F/U: HCPCS | Performed by: INTERNAL MEDICINE

## 2019-01-25 RX ORDER — GABAPENTIN 300 MG/1
300 CAPSULE ORAL 2 TIMES DAILY
COMMUNITY
End: 2019-03-20

## 2019-01-25 RX ORDER — DULOXETIN HYDROCHLORIDE 30 MG/1
30 CAPSULE, DELAYED RELEASE ORAL NIGHTLY
COMMUNITY

## 2019-01-27 LAB — TSH RECEPTOR AB: <0.9 IU/L

## 2019-01-28 DIAGNOSIS — E06.3 HYPOTHYROIDISM DUE TO HASHIMOTO'S THYROIDITIS: Primary | ICD-10-CM

## 2019-01-28 DIAGNOSIS — E03.8 HYPOTHYROIDISM DUE TO HASHIMOTO'S THYROIDITIS: Primary | ICD-10-CM

## 2019-03-20 ENCOUNTER — OFFICE VISIT (OUTPATIENT)
Dept: ENDOCRINOLOGY | Age: 59
End: 2019-03-20
Payer: MEDICARE

## 2019-03-20 VITALS
BODY MASS INDEX: 38.62 KG/M2 | OXYGEN SATURATION: 98 % | WEIGHT: 218 LBS | DIASTOLIC BLOOD PRESSURE: 68 MMHG | SYSTOLIC BLOOD PRESSURE: 112 MMHG | HEART RATE: 86 BPM | HEIGHT: 63 IN

## 2019-03-20 DIAGNOSIS — E06.3 HYPOTHYROIDISM DUE TO HASHIMOTO'S THYROIDITIS: Primary | ICD-10-CM

## 2019-03-20 DIAGNOSIS — E11.69 DIABETES MELLITUS TYPE 2 IN OBESE (HCC): ICD-10-CM

## 2019-03-20 DIAGNOSIS — E03.8 HYPOTHYROIDISM DUE TO HASHIMOTO'S THYROIDITIS: Primary | ICD-10-CM

## 2019-03-20 DIAGNOSIS — E66.9 DIABETES MELLITUS TYPE 2 IN OBESE (HCC): ICD-10-CM

## 2019-03-20 DIAGNOSIS — E66.01 CLASS 2 SEVERE OBESITY WITH SERIOUS COMORBIDITY IN ADULT, UNSPECIFIED BMI, UNSPECIFIED OBESITY TYPE (HCC): ICD-10-CM

## 2019-03-20 PROCEDURE — 2022F DILAT RTA XM EVC RTNOPTHY: CPT | Performed by: NURSE PRACTITIONER

## 2019-03-20 PROCEDURE — 3017F COLORECTAL CA SCREEN DOC REV: CPT | Performed by: NURSE PRACTITIONER

## 2019-03-20 PROCEDURE — 95250 CONT GLUC MNTR PHYS/QHP EQP: CPT | Performed by: NURSE PRACTITIONER

## 2019-03-20 PROCEDURE — G8417 CALC BMI ABV UP PARAM F/U: HCPCS | Performed by: NURSE PRACTITIONER

## 2019-03-20 PROCEDURE — 4004F PT TOBACCO SCREEN RCVD TLK: CPT | Performed by: NURSE PRACTITIONER

## 2019-03-20 PROCEDURE — 3046F HEMOGLOBIN A1C LEVEL >9.0%: CPT | Performed by: NURSE PRACTITIONER

## 2019-03-20 PROCEDURE — G8484 FLU IMMUNIZE NO ADMIN: HCPCS | Performed by: NURSE PRACTITIONER

## 2019-03-20 PROCEDURE — 99214 OFFICE O/P EST MOD 30 MIN: CPT | Performed by: NURSE PRACTITIONER

## 2019-03-20 PROCEDURE — G8427 DOCREV CUR MEDS BY ELIG CLIN: HCPCS | Performed by: NURSE PRACTITIONER

## 2019-03-20 RX ORDER — LEVOTHYROXINE SODIUM 0.03 MG/1
25 TABLET ORAL DAILY
Qty: 30 TABLET | Refills: 3 | Status: SHIPPED | OUTPATIENT
Start: 2019-03-20

## 2019-03-20 ASSESSMENT — ENCOUNTER SYMPTOMS
SHORTNESS OF BREATH: 0
CONSTIPATION: 0
COLOR CHANGE: 0
DIARRHEA: 0
EYE PAIN: 0
NAUSEA: 0

## 2019-04-03 ENCOUNTER — OFFICE VISIT (OUTPATIENT)
Dept: ENDOCRINOLOGY | Age: 59
End: 2019-04-03
Payer: MEDICARE

## 2019-04-03 VITALS
HEART RATE: 88 BPM | RESPIRATION RATE: 16 BRPM | WEIGHT: 218 LBS | OXYGEN SATURATION: 98 % | SYSTOLIC BLOOD PRESSURE: 126 MMHG | DIASTOLIC BLOOD PRESSURE: 71 MMHG | HEIGHT: 63 IN | BODY MASS INDEX: 38.62 KG/M2

## 2019-04-03 DIAGNOSIS — E66.9 DIABETES MELLITUS TYPE 2 IN OBESE (HCC): Primary | ICD-10-CM

## 2019-04-03 DIAGNOSIS — E11.69 DIABETES MELLITUS TYPE 2 IN OBESE (HCC): Primary | ICD-10-CM

## 2019-04-03 PROCEDURE — 3017F COLORECTAL CA SCREEN DOC REV: CPT | Performed by: NURSE PRACTITIONER

## 2019-04-03 PROCEDURE — 4004F PT TOBACCO SCREEN RCVD TLK: CPT | Performed by: NURSE PRACTITIONER

## 2019-04-03 PROCEDURE — 3046F HEMOGLOBIN A1C LEVEL >9.0%: CPT | Performed by: NURSE PRACTITIONER

## 2019-04-03 PROCEDURE — 95251 CONT GLUC MNTR ANALYSIS I&R: CPT | Performed by: NURSE PRACTITIONER

## 2019-04-03 PROCEDURE — 2022F DILAT RTA XM EVC RTNOPTHY: CPT | Performed by: NURSE PRACTITIONER

## 2019-04-03 PROCEDURE — G8427 DOCREV CUR MEDS BY ELIG CLIN: HCPCS | Performed by: NURSE PRACTITIONER

## 2019-04-03 PROCEDURE — G8417 CALC BMI ABV UP PARAM F/U: HCPCS | Performed by: NURSE PRACTITIONER

## 2019-04-03 PROCEDURE — 99214 OFFICE O/P EST MOD 30 MIN: CPT | Performed by: NURSE PRACTITIONER

## 2019-04-03 ASSESSMENT — ENCOUNTER SYMPTOMS
DIARRHEA: 0
CONSTIPATION: 0
SHORTNESS OF BREATH: 0
EYE PAIN: 0
NAUSEA: 0
COLOR CHANGE: 0

## 2019-04-03 NOTE — PROGRESS NOTES
Endocrinology  Erum Diazjeanna, [address] [facility] [address]  Phone: [phone]   FAX: [phone]    Mckenna Casanova is a 62 y.o. female who is a new patient following up for management of Diabetes Mellitus Type 2. Last A1C: 6.8% in labs @ [facility]      Last BP Readings:  BP Readings from Last 3 Encounters:   04/03/19 126/71   03/20/19 112/68   01/25/19 124/69     Last LDL:   Lab Results   Component Value Date    LDLCALC 87 07/15/2010     Aspirin Use: 81 mg    Tobacco/Alcohol History:    Smoking status: Current Every Day Smoker     Packs/day: 1.00     Types: Cigarettes    Smokeless tobacco: Never Used    Alcohol use: Not on file    Drug use: Not on file     Diabetes:  Mckenna Casanova  has been diabetic for 15  years and on insulin for  10 years. Patient reports that diabetes is currently uncontrolled. Disease course has been variable  Current microvascular complications include none  Current Macrovascular complications include none; no h/o CAD, PVD  Patient reports compliance for about 90% of the time and adheres to medication, but usually not to diet and exercise instructions. There have been no recent hospital or ED admissions for hypoglycemia, hyperglycemia or DKA. Thyroid:   H/o hypothyroidism symptoms for 2-3 years and is not currently on any medications.   Current symptoms include fatigue, feeling cold and cold intolerance, constipation, anxiousness, depression, ocular symptoms. Significant SI /depression in past, is doing well on Cymbalta per PCP.   Patient denies diarrhea, goiter,  She is > 15 years postmenopausal.     Obstructive symptoms  - Change in voice: hoarse  - Trouble swallowing: none    Predisposing factors for thyroid disease  Exposure to radiation (neck): no  Exposure to iodine: none, [place]  FH of thyroid disease: sister, niece on synthroid  FH of thyroid or other cancers: mom ( lymphoma)     Lab Results   Component Value Date    TSH 9.11 01/25/2019    TSH 2.76 07/15/2010    FT3 3.1 01/25/2019    T4FREE 0.8 01/25/2019     PMH includes  Past Medical History:   Diagnosis Date    High blood pressure     Hyperlipidemia      Blood glucose trends:  Patient brought log glucometer for download  Readings per day  2 per patient report  Average reading 200s  Lowest in past 2 weeks 55  Highest in past 2 weeks > 250  Hypoglycemia awareness and symptoms: shaky, off balance  Has not done CGM    Current Medication regimen:   Farxiga 5 mg QD  Januvia 100 mg  Basaglar: 70-80 units BID--> takes about 146 units    Other meds tried in past: 2  GFR > 60    Current Dietary regimen:   BF: no  Lunch: potatoes, cheese, chicken  Dinner: usually skips  Snacks: potato chips/cheese  Beverages  Average carbs per day  Usual carbs per meal    Microvascular Complications:  · Neuropathy: denies concerns; has lower back pain and sciatica. · Retinopathy: no concerns with vision, field of vision  · Nephropathy: no recent MACR    Diabetic Health Maintenance   · Last Eye Exam: > 6 months ago, h/o eye surgery for astigmatism  · Last Foot exam: no concerns  · Has patient seen a dietitian? Yes  · Current Exercise: No structured exercise  · On ACEI or ARB: Prinzide 10 -12.5 mg  · On statin: Lipitor 40 mg    Hyperlipidemia: Current complaints include occasional myalgias but otherwise tolerates well. Lab Results   Component Value Date    CHOL 155 07/15/2010     Lab Results   Component Value Date    TRIG 157 07/15/2010     Lab Results   Component Value Date    HDL 37 07/15/2010     Lab Results   Component Value Date    LDLCALC 87 07/15/2010     No results found for: LDLDIRECT  No results found for: CHOLHDLRATIO    Vitamin D deficiency: Currently is on 5000IU qD. Current complaints include fatigue on daily basis. Last vitamin Dlevel is:  No results found for: VD23T, VITD3, VD25, VITD25    Hypertension   Controlled , denies symptoms of dizziness, light headedness. Occasional dependent edema. Tries to follow a salt restricted diet. Lab Results   Component Value Date     07/15/2010    K 5.4 (H) 07/15/2010     07/15/2010    CO2 30 07/15/2010    BUN 23 (H) 07/15/2010    CREATININE 1.2 (H) 07/15/2010    GLUCOSE 155 (H) 07/15/2010    CALCIUM 9.8 07/15/2010    PROT 7.0 07/15/2010    LABALBU 4.3 07/15/2010    BILITOT 0.20 07/15/2010    ALKPHOS 47 07/15/2010    AST 20 07/15/2010    ALT 21 07/15/2010    GFRAA >60 07/15/2010    AGRATIO 1.6 07/15/2010     The ASCVD Risk score (Emily Arrington, et al., 2013) failed to calculate for the following reasons:    Cannot find a previous HDL lab    Cannot find a previous total cholesterol lab    No family history on file. Review of Systems   Constitutional: Negative for activity change, appetite change, diaphoresis, fever and unexpected weight change. HENT: Negative for dental problem. Eyes: Negative for pain and visual disturbance. Respiratory: Negative for shortness of breath. Cardiovascular: Negative for chest pain, palpitations and leg swelling. Gastrointestinal: Negative for constipation, diarrhea and nausea. Endocrine: Negative for cold intolerance, heat intolerance, polydipsia, polyphagia and polyuria. Genitourinary: Negative for frequency and urgency. Musculoskeletal: Negative for arthralgias, joint swelling and myalgias. Skin: Negative for color change and pallor. Neurological: Negative for weakness, numbness and headaches. Psychiatric/Behavioral: Negative for dysphoric mood and sleep disturbance. The patient is not nervous/anxious. Vitals:    04/03/19 1407   BP: 126/71   Site: Right Upper Arm   Position: Sitting   Pulse: 88   Resp: 16   SpO2: 98%   Weight: 218 lb (98.9 kg)   Height: 5' 3\" (1.6 m)     Physical Exam   Constitutional: She is oriented to person, place, and time. She appears well-developed and well-nourished. Eyes: Pupils are equal, round, and reactive to light. Neck: Normal range of motion. No thyromegaly present. Cardiovascular: Normal rate, regular rhythm and normal heart sounds. Pulmonary/Chest: Effort normal and breath sounds normal.   Abdominal: She exhibits no distension. Musculoskeletal: Normal range of motion. She exhibits no edema. Neurological: She is alert and oriented to person, place, and time. No sensory deficit. Skin: Skin is warm and dry. No skin changes or evidence of trauma. Psychiatric: She has a normal mood and affect. Her behavior is normal. Thought content normal.   Vitals reviewed. Skeletal foot exam:declined. Assessment  Andressa Rogers is a 62 y.o. female with uncontrolled Diabetes Mellitus type 2 complicated by neuropathy and associated with Hashimoto's hypothyroidism    Plan  Problem List Items Addressed This Visit     Diabetes mellitus type 2 in obese ([address] - Primary    Relevant Medications    insulin glargine (TOUJEO MAX SOLOSTAR) 300 UNIT/ML injection pen    dapagliflozin (FARXIGA) 10 MG tablet    Other Relevant Orders    Hemoglobin A1C    Lipid Panel    Comprehensive Metabolic Panel    TSH WITH REFLEX TO FT4    Microalbumin / Creatinine Urine Ratio    [facility] CONTINUOUS GLUCOSE MONITORING ANALYSIS I&R (Completed)        Greater than 40 minutes spent directly counseling patient about topics listed above (such as lifestyle modifications, preventative screenings and/or disease related processes. Return in about 3 months (around 7/3/2019).

## 2019-04-03 NOTE — PATIENT INSTRUCTIONS
Decrease Toujeo to 100 units at night  Start Trulicity @ 9.05 mg once a week  Increased farxiga to 10 mg QD    Titrate Toujeo to morning fasting levels of 90 -120     Compare to 2- 3 hours of after meal sugars  HbA1c  4.0 4.1 4.2 4.3 4.4 4.5 4.6 4.7 4.8 4.9  Glucose 68 71 74 77 80 82 85 88 91 94    HbA1c  5.0 5.1 5.2 5.3 5.4 5.5 5.6 5.7 5.8 5.9  Glucose 97 100 103 105 108 111 114 117 120 123    HbA1c  6.0 6.1 6.2 6.3 6.4 6.5 6.6 6.7 6.8 6.9  Glucose 125 128 131 134 137 140 143 146 148 151    HbA1c  7.0 7.1 7.2 7.3 7.4 7.5 7.6 7.7 7.8 7.9  Glucose 154 157 160 163 166 169 171 174 177 180    HbA1c  8.0 8.1 8.2 8.3 8.4 8.5 8.6 8.7 8.8 8.9  Glucose 183 186 189 192 194 197 200 203 206 209    HbA1c  9.0 9.1 9.2 9.3 9.4 9.5 9.6 9.7 9.8 9.9  Glucose 212 214 217 220 223 226 229 232 235 237    HbA1c  10.0 10.1 10.2 10.3 10.4 10.5 10.6 10.7 10.8 10.9  Glucose 240 243 246 249 252 255 258 260 263 266    HbA1c  11.0 11.1 11.2 11.3 11.4 11.5 11.6 11.7 11.8 11.9  Glucose 269 272 275 278 280 283 286 289 292 295    HbA1c  12.0 12.1 12.2 12.3 12.4 12.5 12.6 12.7 12.8 12.9  Glucose 298 301 303 306 309 312 315 318 321 324    HbA1c  13.0 13.1 13.2 13.3 13.4 13.5 13.6 13.7 13.8 13.9  Glucose 326 329 332 335 338 341 344 346 349 352

## 2019-04-05 ENCOUNTER — PATIENT MESSAGE (OUTPATIENT)
Dept: ENDOCRINOLOGY | Age: 59
End: 2019-04-05

## 2019-04-05 NOTE — TELEPHONE ENCOUNTER
From: Deuce Goldman  To: Marlen Burrell, APRN - CNP  Sent: 4/5/2019 10:36 AM EDT  Subject: Prescription Question    Stevenalfredo Krysta,    It was my understanding I am to start Trulicity . 75 weekly. Mellisa in [facility] received the order for the [employer] and the [employer] increase but state they do not have an order for Trulicity. If I misunderstood please ignore this email. If not would you please submit the order to them? To clarify if I tolerate the Trulicty I would discontinue the Januvia but I failed to ask you how long to try the Trulicity before stop the Januvia? Lastly, for now, I realized yesterday [facility] did not have the Trulicity order but was not around email, tried to call and just leave a message for you but not sure I know the best number to use since realize you have different practice sites. Is there a preferred #?      Thanks~Margaret

## 2019-04-07 NOTE — ASSESSMENT & PLAN NOTE
Decrease Toujeo to 100 units at night  Start Trulicity @ 5.97 mg once a week  Increased farxiga to 10 mg QD    Titrate Toujeo to morning fasting levels of 90 -120     Compare to 2- 3 hours of after meal sugars and aim for a range of 120-140

## 2019-04-09 NOTE — TELEPHONE ENCOUNTER
Pt returned Catie Chaparro call, yes she has seen email but PT states pharmacy says her insurance needs pre-approval for Farxiga 10mg.  PT would like the pharmacy to receive a call and a script written for her to get the farxiga at 10mg,

## 2019-04-23 ENCOUNTER — TELEPHONE (OUTPATIENT)
Dept: ENDOCRINOLOGY | Age: 59
End: 2019-04-23

## 2019-04-23 NOTE — TELEPHONE ENCOUNTER
Claudia from [facility] called for a prior auth for trulicity 7.90XN the script was sent on 4-5-19.  Insurance is requiring this

## 2019-04-24 NOTE — TELEPHONE ENCOUNTER
Tried to call [facility] to check the status of the PA that was faxed on 4/17/19 but I was on hold for over 30 mins. Will try again tomorrow.

## 2019-07-03 ENCOUNTER — OFFICE VISIT (OUTPATIENT)
Dept: ENDOCRINOLOGY | Age: 59
End: 2019-07-03
Payer: MEDICARE

## 2019-07-03 VITALS
BODY MASS INDEX: 33.57 KG/M2 | HEIGHT: 64 IN | WEIGHT: 196.6 LBS | RESPIRATION RATE: 16 BRPM | OXYGEN SATURATION: 99 % | SYSTOLIC BLOOD PRESSURE: 109 MMHG | DIASTOLIC BLOOD PRESSURE: 72 MMHG | HEART RATE: 110 BPM

## 2019-07-03 DIAGNOSIS — E66.9 DIABETES MELLITUS TYPE 2 IN OBESE (HCC): Primary | ICD-10-CM

## 2019-07-03 DIAGNOSIS — E03.8 HYPOTHYROIDISM DUE TO HASHIMOTO'S THYROIDITIS: ICD-10-CM

## 2019-07-03 DIAGNOSIS — R79.89 ABNORMAL TSH: ICD-10-CM

## 2019-07-03 DIAGNOSIS — E06.3 HYPOTHYROIDISM DUE TO HASHIMOTO'S THYROIDITIS: ICD-10-CM

## 2019-07-03 DIAGNOSIS — E11.69 DIABETES MELLITUS TYPE 2 IN OBESE (HCC): Primary | ICD-10-CM

## 2019-07-03 DIAGNOSIS — E66.01 CLASS 2 SEVERE OBESITY WITH SERIOUS COMORBIDITY IN ADULT, UNSPECIFIED BMI, UNSPECIFIED OBESITY TYPE (HCC): ICD-10-CM

## 2019-07-03 LAB — HBA1C MFR BLD: 6.5 %

## 2019-07-03 PROCEDURE — 3044F HG A1C LEVEL LT 7.0%: CPT | Performed by: NURSE PRACTITIONER

## 2019-07-03 PROCEDURE — 2022F DILAT RTA XM EVC RTNOPTHY: CPT | Performed by: NURSE PRACTITIONER

## 2019-07-03 PROCEDURE — 4004F PT TOBACCO SCREEN RCVD TLK: CPT | Performed by: NURSE PRACTITIONER

## 2019-07-03 PROCEDURE — 3017F COLORECTAL CA SCREEN DOC REV: CPT | Performed by: NURSE PRACTITIONER

## 2019-07-03 PROCEDURE — G8427 DOCREV CUR MEDS BY ELIG CLIN: HCPCS | Performed by: NURSE PRACTITIONER

## 2019-07-03 PROCEDURE — 83036 HEMOGLOBIN GLYCOSYLATED A1C: CPT | Performed by: NURSE PRACTITIONER

## 2019-07-03 PROCEDURE — 99214 OFFICE O/P EST MOD 30 MIN: CPT | Performed by: NURSE PRACTITIONER

## 2019-07-03 PROCEDURE — G8417 CALC BMI ABV UP PARAM F/U: HCPCS | Performed by: NURSE PRACTITIONER

## 2019-07-03 ASSESSMENT — ENCOUNTER SYMPTOMS
COLOR CHANGE: 0
CONSTIPATION: 0
DIARRHEA: 0
EYE PAIN: 0
SHORTNESS OF BREATH: 0
NAUSEA: 0

## 2019-07-03 NOTE — ASSESSMENT & PLAN NOTE
Last A1c  = 6.5%    At goal currently with A1c  Toujeo dose down to 70 units  Continue Ozempic at 0.5 mg  Stop januvia at this time    Next step is to continue to continue to decrease Toujeo and weight:   1. Early dinners  2. Low carb in dinner  3.  Maintain range of  in fasting AM

## 2019-07-03 NOTE — PATIENT INSTRUCTIONS
Last A1c  = 6.5%    At goal currently  Next step is to continue to decrease Toujeo and weight  1. Early dinners  2. Low carb in dinner  3. Maintain range of  in fasting AM  4.  Stop januvia at this time

## 2019-07-15 DIAGNOSIS — E11.69 DIABETES MELLITUS TYPE 2 IN OBESE (HCC): Primary | ICD-10-CM

## 2019-07-15 DIAGNOSIS — E66.9 DIABETES MELLITUS TYPE 2 IN OBESE (HCC): Primary | ICD-10-CM

## 2019-08-04 DIAGNOSIS — E11.69 DIABETES MELLITUS TYPE 2 IN OBESE (HCC): ICD-10-CM

## 2019-08-04 DIAGNOSIS — E66.9 DIABETES MELLITUS TYPE 2 IN OBESE (HCC): ICD-10-CM

## 2019-08-07 RX ORDER — DAPAGLIFLOZIN 10 MG/1
TABLET, FILM COATED ORAL
Qty: 30 TABLET | Refills: 3 | Status: SHIPPED | OUTPATIENT
Start: 2019-08-07 | End: 2019-12-02 | Stop reason: SDUPTHER

## 2019-09-03 DIAGNOSIS — E11.69 DIABETES MELLITUS TYPE 2 IN OBESE (HCC): ICD-10-CM

## 2019-09-03 DIAGNOSIS — E66.9 DIABETES MELLITUS TYPE 2 IN OBESE (HCC): ICD-10-CM

## 2019-09-04 RX ORDER — INSULIN GLARGINE 300 U/ML
INJECTION, SOLUTION SUBCUTANEOUS
Qty: 4 PEN | Refills: 3 | Status: SHIPPED | OUTPATIENT
Start: 2019-09-04 | End: 2019-12-02 | Stop reason: SDUPTHER

## 2019-09-11 ENCOUNTER — TELEPHONE (OUTPATIENT)
Dept: ORTHOPEDIC SURGERY | Age: 59
End: 2019-09-11

## 2019-10-01 ENCOUNTER — TELEPHONE (OUTPATIENT)
Dept: ENDOCRINOLOGY | Age: 59
End: 2019-10-01

## 2019-10-01 DIAGNOSIS — E66.01 CLASS 2 SEVERE OBESITY WITH SERIOUS COMORBIDITY IN ADULT, UNSPECIFIED BMI, UNSPECIFIED OBESITY TYPE (HCC): ICD-10-CM

## 2019-10-01 DIAGNOSIS — E66.9 DIABETES MELLITUS TYPE 2 IN OBESE (HCC): Primary | ICD-10-CM

## 2019-10-01 DIAGNOSIS — E11.69 DIABETES MELLITUS TYPE 2 IN OBESE (HCC): Primary | ICD-10-CM

## 2019-10-09 ENCOUNTER — OFFICE VISIT (OUTPATIENT)
Dept: ENDOCRINOLOGY | Age: 59
End: 2019-10-09
Payer: MEDICARE

## 2019-10-09 VITALS
OXYGEN SATURATION: 99 % | RESPIRATION RATE: 18 BRPM | HEART RATE: 93 BPM | SYSTOLIC BLOOD PRESSURE: 121 MMHG | WEIGHT: 181.2 LBS | BODY MASS INDEX: 30.93 KG/M2 | DIASTOLIC BLOOD PRESSURE: 75 MMHG | HEIGHT: 64 IN

## 2019-10-09 DIAGNOSIS — E06.3 HYPOTHYROIDISM DUE TO HASHIMOTO'S THYROIDITIS: ICD-10-CM

## 2019-10-09 DIAGNOSIS — E03.8 HYPOTHYROIDISM DUE TO HASHIMOTO'S THYROIDITIS: ICD-10-CM

## 2019-10-09 DIAGNOSIS — E11.69 DIABETES MELLITUS TYPE 2 IN OBESE (HCC): ICD-10-CM

## 2019-10-09 DIAGNOSIS — E66.9 DIABETES MELLITUS TYPE 2 IN OBESE (HCC): ICD-10-CM

## 2019-10-09 DIAGNOSIS — E66.01 CLASS 2 SEVERE OBESITY WITH SERIOUS COMORBIDITY IN ADULT, UNSPECIFIED BMI, UNSPECIFIED OBESITY TYPE (HCC): Primary | ICD-10-CM

## 2019-10-09 PROCEDURE — G8427 DOCREV CUR MEDS BY ELIG CLIN: HCPCS | Performed by: NURSE PRACTITIONER

## 2019-10-09 PROCEDURE — 4004F PT TOBACCO SCREEN RCVD TLK: CPT | Performed by: NURSE PRACTITIONER

## 2019-10-09 PROCEDURE — G8484 FLU IMMUNIZE NO ADMIN: HCPCS | Performed by: NURSE PRACTITIONER

## 2019-10-09 PROCEDURE — 3017F COLORECTAL CA SCREEN DOC REV: CPT | Performed by: NURSE PRACTITIONER

## 2019-10-09 PROCEDURE — 2022F DILAT RTA XM EVC RTNOPTHY: CPT | Performed by: NURSE PRACTITIONER

## 2019-10-09 PROCEDURE — 99213 OFFICE O/P EST LOW 20 MIN: CPT | Performed by: NURSE PRACTITIONER

## 2019-10-09 PROCEDURE — 3044F HG A1C LEVEL LT 7.0%: CPT | Performed by: NURSE PRACTITIONER

## 2019-10-09 PROCEDURE — G8417 CALC BMI ABV UP PARAM F/U: HCPCS | Performed by: NURSE PRACTITIONER

## 2019-10-09 RX ORDER — LIDOCAINE AND PRILOCAINE 25; 25 MG/G; MG/G
CREAM TOPICAL
COMMUNITY
Start: 2019-09-26 | End: 2021-10-06

## 2019-10-09 SDOH — HEALTH STABILITY: MENTAL HEALTH: HOW OFTEN DO YOU HAVE A DRINK CONTAINING ALCOHOL?: NEVER

## 2019-10-09 ASSESSMENT — ENCOUNTER SYMPTOMS
DIARRHEA: 0
EYE PAIN: 0
SHORTNESS OF BREATH: 0
COLOR CHANGE: 0
CONSTIPATION: 0
NAUSEA: 0

## 2019-12-02 DIAGNOSIS — E66.9 DIABETES MELLITUS TYPE 2 IN OBESE (HCC): ICD-10-CM

## 2019-12-02 DIAGNOSIS — E11.69 DIABETES MELLITUS TYPE 2 IN OBESE (HCC): ICD-10-CM

## 2019-12-02 RX ORDER — INSULIN GLARGINE 300 U/ML
INJECTION, SOLUTION SUBCUTANEOUS
Qty: 4 PEN | Refills: 3 | Status: SHIPPED | OUTPATIENT
Start: 2019-12-02 | End: 2021-02-10 | Stop reason: SDUPTHER

## 2020-03-31 NOTE — TELEPHONE ENCOUNTER
LOV: 10/9/19    NOV: 4/21/2020    DOSE: 10 mg     Frequency: QD    Pharmacy as Pended:     Per LOV Note: Farxiga 10 mg QD  Per Last PHONE NOTE:     Lab Results   Component Value Date    LABA1C 6.5 07/03/2019

## 2020-04-21 ENCOUNTER — VIRTUAL VISIT (OUTPATIENT)
Dept: ENDOCRINOLOGY | Age: 60
End: 2020-04-21
Payer: MEDICARE

## 2020-04-21 PROCEDURE — 3017F COLORECTAL CA SCREEN DOC REV: CPT | Performed by: NURSE PRACTITIONER

## 2020-04-21 PROCEDURE — 3046F HEMOGLOBIN A1C LEVEL >9.0%: CPT | Performed by: NURSE PRACTITIONER

## 2020-04-21 PROCEDURE — 2022F DILAT RTA XM EVC RTNOPTHY: CPT | Performed by: NURSE PRACTITIONER

## 2020-04-21 PROCEDURE — G8427 DOCREV CUR MEDS BY ELIG CLIN: HCPCS | Performed by: NURSE PRACTITIONER

## 2020-04-21 PROCEDURE — 99214 OFFICE O/P EST MOD 30 MIN: CPT | Performed by: NURSE PRACTITIONER

## 2020-04-21 ASSESSMENT — ENCOUNTER SYMPTOMS
SHORTNESS OF BREATH: 0
NAUSEA: 0
DIARRHEA: 0
CONSTIPATION: 0
EYE PAIN: 0
COLOR CHANGE: 0

## 2020-04-21 NOTE — PROGRESS NOTES
Trouble swallowing: none    Predisposing factors for thyroid disease  Exposure to radiation (neck): no  Exposure to iodine: none, [place]  FH of thyroid disease: sister, niece on synthroid  FH of thyroid or other cancers: mom ( lymphoma)     Lab Results   Component Value Date    TSH 9.11 01/25/2019    TSH 2.76 07/15/2010    FT3 3.1 01/25/2019    T4FREE 0.8 01/25/2019     PMH includes  Past Medical History:   Diagnosis Date    High blood pressure     Hyperlipidemia      Blood glucose trends:  Patient brought log glucometer for download  Readings per day  2 per patient report  Average reading 200s  Lowest in past 2 weeks 55  Highest in past 2 weeks > 250  Hypoglycemia awareness and symptoms: shaky, off balance  Has not done CGM    Current Medication regimen:   Farxiga 10 mg QD  Ozempic 0.5 mg-->wants to increase  Toujeo 70 units QHS--> 58 units--> 40 units    Other meds tried in past: 2;  Basaglar: 70-80 units BID--> takes about 146 units;  Januvia 100 mg stopped when switched to GLP1  GFR > 60    Current Dietary regimen:   BF: no  Lunch: potatoes, cheese, chicken-->   Dinner: usually skips  Snacks: potato chips/cheese  Beverages: crystal light  Average carbs per day 20-40   Usual carbs per meal    Microvascular Complications:  · Neuropathy: denies concerns; has lower back pain and sciatica. · Retinopathy: no concerns with vision, field of vision  · Nephropathy: no albuminuria ( see media labs). Diabetic Health Maintenance   · Last Eye Exam: > 6 months ago, h/o eye surgery for astigmatism, \"glaucoma suspect\"  · Last Foot exam: no concerns, has neuropathy r/t back pain  · Has patient seen a dietitian? Yes  · Current Exercise: No structured exercise  · On ACEI or ARB: Prinzide 10 -12.5 mg  · On statin: Lipitor 40 mg    Hyperlipidemia: Current complaints include occasional myalgias but otherwise tolerates well.   Lab Results   Component Value Date    CHOL 155 07/15/2010     Lab

## 2020-04-22 RX ORDER — SEMAGLUTIDE 1.34 MG/ML
1 INJECTION, SOLUTION SUBCUTANEOUS WEEKLY
Qty: 2 PEN | Refills: 2 | Status: SHIPPED | OUTPATIENT
Start: 2020-04-22 | End: 2020-06-18 | Stop reason: SDUPTHER

## 2020-04-22 NOTE — ASSESSMENT & PLAN NOTE
Weight stable   Recent 5 lbs weight loss  Reviewed carbohyrate and caloric restriction  Emphasis on former at this time  Ensure good hydration and adequate electrolyte replacement

## 2020-05-01 DIAGNOSIS — E06.3 HYPOTHYROIDISM DUE TO HASHIMOTO'S THYROIDITIS: ICD-10-CM

## 2020-05-01 DIAGNOSIS — E66.9 DIABETES MELLITUS TYPE 2 IN OBESE (HCC): ICD-10-CM

## 2020-05-01 DIAGNOSIS — E11.69 DIABETES MELLITUS TYPE 2 IN OBESE (HCC): ICD-10-CM

## 2020-05-01 DIAGNOSIS — E03.8 HYPOTHYROIDISM DUE TO HASHIMOTO'S THYROIDITIS: ICD-10-CM

## 2020-05-01 LAB
T4 FREE: 1.4 NG/DL (ref 0.9–1.8)
TSH SERPL DL<=0.05 MIU/L-ACNC: 2.44 UIU/ML (ref 0.27–4.2)

## 2020-05-02 LAB
ESTIMATED AVERAGE GLUCOSE: 142.7 MG/DL
HBA1C MFR BLD: 6.6 %

## 2020-06-18 RX ORDER — SEMAGLUTIDE 1.34 MG/ML
0.5 INJECTION, SOLUTION SUBCUTANEOUS WEEKLY
Qty: 2 PEN | Refills: 2 | Status: SHIPPED | OUTPATIENT
Start: 2020-06-18 | End: 2020-10-05

## 2020-07-15 RX ORDER — BLOOD SUGAR DIAGNOSTIC
STRIP MISCELLANEOUS
Qty: 100 STRIP | Refills: 3 | Status: SHIPPED | OUTPATIENT
Start: 2020-07-15 | End: 2021-02-01 | Stop reason: SDUPTHER

## 2020-09-08 ENCOUNTER — TELEPHONE (OUTPATIENT)
Dept: ENDOCRINOLOGY | Age: 60
End: 2020-09-08

## 2020-09-08 RX ORDER — PEN NEEDLE, DIABETIC 31 GX5/16"
NEEDLE, DISPOSABLE MISCELLANEOUS
Qty: 100 EACH | Refills: 2 | Status: SHIPPED | OUTPATIENT
Start: 2020-09-08 | End: 2020-12-25 | Stop reason: SDUPTHER

## 2020-10-05 RX ORDER — SEMAGLUTIDE 1.34 MG/ML
INJECTION, SOLUTION SUBCUTANEOUS
Qty: 3 ML | Refills: 3 | Status: SHIPPED | OUTPATIENT
Start: 2020-10-05 | Stop reason: SDUPTHER

## 2020-10-05 NOTE — TELEPHONE ENCOUNTER
Medication:   Requested Prescriptions     Pending Prescriptions Disp Refills    OZEMPIC, 0.25 OR 0.5 MG/DOSE, 2 MG/1.5ML SOPN [Pharmacy Med Name: OZEMPIC 0.25 OR 0.5MG/DOS 1X2MG PEN] 3 mL      Sig: INJECT 0.25 MG ONCE WEEKLY FOR 4 WEEKS, INCREASE DOSAGE TO 0.5 MG WEEKLY         Last appt: 4/21/2020   Next appt: 10/27/2020    Last Labs DM:   Lab Results   Component Value Date    LABA1C 6.6 05/01/2020

## 2020-11-18 NOTE — TELEPHONE ENCOUNTER
Medication:   Requested Prescriptions     Pending Prescriptions Disp Refills    dapagliflozin (FARXIGA) 10 MG tablet 30 tablet 3     Sig: TAKE 1 TABLET BY MOUTH EVERY MORNING         Last appt: 04/21/2020  Next appt: 02/10/2020    Last Labs DM:   Lab Results   Component Value Date    LABA1C 6.6 05/01/2020

## 2020-12-23 NOTE — TELEPHONE ENCOUNTER
Medication:   Requested Prescriptions     Pending Prescriptions Disp Refills    Insulin Pen Needle (B-D UF III MINI PEN NEEDLES) 31G X 5 MM MISC 100 each 2     Sig: Use for toujeo 3 times daily plus with ozempic once a week       Last appt: 04/21/2020  Next appt: 02/10/2021    Last Labs DM:   Lab Results   Component Value Date    LABA1C 6.6 05/01/2020

## 2020-12-25 RX ORDER — PEN NEEDLE, DIABETIC 31 GX5/16"
NEEDLE, DISPOSABLE MISCELLANEOUS
Qty: 100 EACH | Refills: 2 | Status: SHIPPED | OUTPATIENT
Start: 2020-12-25

## 2021-02-01 DIAGNOSIS — E11.69 DIABETES MELLITUS TYPE 2 IN OBESE (HCC): ICD-10-CM

## 2021-02-01 DIAGNOSIS — E66.9 DIABETES MELLITUS TYPE 2 IN OBESE (HCC): ICD-10-CM

## 2021-02-01 RX ORDER — BLOOD SUGAR DIAGNOSTIC
STRIP MISCELLANEOUS
Qty: 100 STRIP | Refills: 3 | Status: SHIPPED | OUTPATIENT
Start: 2021-02-01 | End: 2022-02-02

## 2021-02-01 NOTE — TELEPHONE ENCOUNTER
Medication:   Requested Prescriptions     Pending Prescriptions Disp Refills    blood glucose test strips (ONETOUCH ULTRA) strip 100 strip 3     Sig: USE TO TEST BLOOD SUGAR TWICE DAILY AS DIRECTED         Last appt: 04/21/2020  Next appt: 02/10/2021    Last Labs DM:   Lab Results   Component Value Date    LABA1C 6.6 05/01/2020

## 2021-02-10 ENCOUNTER — OFFICE VISIT (OUTPATIENT)
Dept: ENDOCRINOLOGY | Age: 61
End: 2021-02-10
Payer: MEDICARE

## 2021-02-10 VITALS
SYSTOLIC BLOOD PRESSURE: 118 MMHG | HEART RATE: 68 BPM | OXYGEN SATURATION: 99 % | BODY MASS INDEX: 30.29 KG/M2 | HEIGHT: 64 IN | DIASTOLIC BLOOD PRESSURE: 78 MMHG | WEIGHT: 177.4 LBS

## 2021-02-10 DIAGNOSIS — E66.01 CLASS 2 SEVERE OBESITY WITH SERIOUS COMORBIDITY IN ADULT, UNSPECIFIED BMI, UNSPECIFIED OBESITY TYPE (HCC): ICD-10-CM

## 2021-02-10 DIAGNOSIS — E06.3 HYPOTHYROIDISM DUE TO HASHIMOTO'S THYROIDITIS: ICD-10-CM

## 2021-02-10 DIAGNOSIS — E66.9 DIABETES MELLITUS TYPE 2 IN OBESE (HCC): Primary | ICD-10-CM

## 2021-02-10 DIAGNOSIS — E03.8 HYPOTHYROIDISM DUE TO HASHIMOTO'S THYROIDITIS: ICD-10-CM

## 2021-02-10 DIAGNOSIS — E11.69 DIABETES MELLITUS TYPE 2 IN OBESE (HCC): Primary | ICD-10-CM

## 2021-02-10 PROCEDURE — 3017F COLORECTAL CA SCREEN DOC REV: CPT | Performed by: NURSE PRACTITIONER

## 2021-02-10 PROCEDURE — G8417 CALC BMI ABV UP PARAM F/U: HCPCS | Performed by: NURSE PRACTITIONER

## 2021-02-10 PROCEDURE — 99214 OFFICE O/P EST MOD 30 MIN: CPT | Performed by: NURSE PRACTITIONER

## 2021-02-10 PROCEDURE — 2022F DILAT RTA XM EVC RTNOPTHY: CPT | Performed by: NURSE PRACTITIONER

## 2021-02-10 PROCEDURE — G8484 FLU IMMUNIZE NO ADMIN: HCPCS | Performed by: NURSE PRACTITIONER

## 2021-02-10 PROCEDURE — 4004F PT TOBACCO SCREEN RCVD TLK: CPT | Performed by: NURSE PRACTITIONER

## 2021-02-10 PROCEDURE — 3046F HEMOGLOBIN A1C LEVEL >9.0%: CPT | Performed by: NURSE PRACTITIONER

## 2021-02-10 PROCEDURE — G8427 DOCREV CUR MEDS BY ELIG CLIN: HCPCS | Performed by: NURSE PRACTITIONER

## 2021-02-10 RX ORDER — INSULIN GLARGINE 300 U/ML
50 INJECTION, SOLUTION SUBCUTANEOUS NIGHTLY
Qty: 4 PEN | Refills: 3 | Status: SHIPPED | OUTPATIENT
Start: 2021-02-10 | End: 2021-08-11 | Stop reason: SDUPTHER

## 2021-02-10 RX ORDER — LISINOPRIL 10 MG/1
10 TABLET ORAL DAILY
COMMUNITY
Start: 2021-02-07

## 2021-02-10 ASSESSMENT — ENCOUNTER SYMPTOMS
DIARRHEA: 0
COLOR CHANGE: 0
CONSTIPATION: 0
EYE PAIN: 0
NAUSEA: 0
SHORTNESS OF BREATH: 0

## 2021-02-10 NOTE — ASSESSMENT & PLAN NOTE
Reviewed carbohyrate and caloric restriction  Emphasis on former at this time  Ensure good hydration and adequate electrolyte replacement  Weight loss to 10 lbs

## 2021-02-10 NOTE — ASSESSMENT & PLAN NOTE
Lab Results   Component Value Date    LABA1C 6.6 05/01/2020     Goal A1c  < 6.5%  Insulin: discussed titration with lowering of CHO in diet  Medication: no change  Avoid hypoglycemia    Diet :   30-40 grams per meal , 3 meals per day, avoid carbs in snack choices  Include moderate proteins and good fats   Ensure adequate hydration and  electrolyte replacement    Exercise :  Recommended exercise is 5-7 days a week for 30-60 mins at least, per day OR a total of 2.5 hours per week , which ever is more feasible. Has plantars wart in left foot which makes it painful to ambulate    Diabetic Health Maintenance  Follow up with annual eye exams  Follow up with annual podiatry exams if needed  Reviewed sick day management of BG     Other areas of Diabetic Education reviewed:   Carbs: good carbs and bad carbs, importance of carb counting, incorporation of protein with each meal to reduce Glycemic index, importance of portions, Carb/insulin ratio   Fats: Good fats and bad fats, meal planning and supplements.  Discussed how food affects blood sugar readings.     Different diabetic medications   Managing high and low sugar readings   Rotation of sites for subcutaneous medication injection

## 2021-02-10 NOTE — PROGRESS NOTES
Endocrinology  Patrick Diss, CNP, [address] [facility] [address] [facility], [address]  Phone [phone]  Fax [phone]          Edna Becker is a 59 y.o. female who is a new patient following up for management of Diabetes Mellitus Type 2. PCP: NITA Humphreys-CNP    A1c: 6.8%--> 6.5%--> 6.5%--> 6.7%    Last BP Readings:  BP Readings from Last 3 Encounters:   02/10/21 118/78   10/09/19 121/75   07/03/19 109/72     Last LDL:   Lab Results   Component Value Date    LDLCALC 87 07/15/2010     Aspirin Use: 81 mg    Tobacco/Alcohol History:    Smoking status: Current Every Day Smoker     Packs/day: 1.00     Types: Cigarettes    Smokeless tobacco: Never Used    Alcohol use: Not on file    Drug use: Not on file     HPI on 2/10/2021  Reports no new concerns  Has a plantars wart, preventing her from exercise  Continues to titrate tresiba @ 40 units QHS  About 10 lbs weight loss: on low CHO diet      Diabetes:  Edna Becker  has been diabetic for 15  years and on insulin for  10 years. Patient reports that diabetes is currently uncontrolled. Disease course has been variable  Current microvascular complications include none  Current Macrovascular complications include none; no h/o CAD, PVD  Patient reports compliance for about 90% of the time and adheres to medication, but usually not to diet and exercise instructions. There have been no recent hospital or ED admissions for hypoglycemia, hyperglycemia or DKA. Thyroid:   H/o hypothyroidism symptoms for 2-3 years and is currently on 25 mcg QD  Current symptoms include fatigue, feeling cold and cold intolerance, constipation, anxiousness, depression, ocular symptoms. Significant SI /depression in past, is doing well on Cymbalta per PCP.   Patient denies diarrhea, goiter,  She is > 15 years postmenopausal.     Obstructive symptoms  - Change in voice: hoarse  - Trouble swallowing: none    Predisposing factors for thyroid disease  Exposure to radiation (neck): no  Exposure to iodine: none, [place]  FH of thyroid disease: sister, niece on synthroid  FH of thyroid or other cancers: mom ( lymphoma)     Lab Results   Component Value Date    TSH 2.44 05/01/2020    TSH 9.11 01/25/2019    TSH 2.76 07/15/2010    FT3 3.1 01/25/2019    T4FREE 1.4 05/01/2020    T4FREE 0.8 01/25/2019     PMH includes  Past Medical History:   Diagnosis Date    Diabetes mellitus type 2 in nonobese ([facility] [address]     High blood pressure     Hyperlipidemia      Blood glucose trends:  Patient brought log glucometer for download  Readings per day  2 per patient report  Average reading 200s  Lowest in past 2 weeks 55  Highest in past 2 weeks > 250  Hypoglycemia awareness and symptoms: shaky, off balance  Has not done CGM    Current Medication regimen:   Farxiga 10 mg QD  Ozempic 0.5 mg-->has s/e @ 1 mg  Toujeo 70 units QHS--> 58 units--> 40 units    Other meds tried in past: 2;  Basaglar: 70-80 units BID--> takes about 146 units;  Januvia 100 mg stopped when switched to GLP1  GFR > 60    Current Dietary regimen:   BF: no  Lunch: potatoes, cheese, chicken-->   Dinner: usually skips  Snacks: potato chips/cheese  Beverages: crystal light  Average carbs per day 20-40   Usual carbs per meal    Microvascular Complications:  · Neuropathy: denies concerns; has lower back pain and sciatica. · Retinopathy: no concerns with vision, field of vision  · Nephropathy: no albuminuria ( see media labs). Diabetic Health Maintenance   · Last Eye Exam: > 6 months ago, h/o eye surgery for astigmatism, \"glaucoma suspect\"  · Last Foot exam: no concerns, has neuropathy r/t back pain  · Has patient seen a dietitian? Yes  · Current Exercise: No structured exercise  · On ACEI or ARB: Prinzide 10 -12.5 mg  · On statin: Lipitor 40 mg    Hyperlipidemia: Current complaints include occasional myalgias but otherwise tolerates well.   Lab Results   Component Value Date CHOL 155 07/15/2010     Lab Results   Component Value Date    TRIG 157 07/15/2010     Lab Results   Component Value Date    HDL 37 07/15/2010     Lab Results   Component Value Date    LDLCALC 87 07/15/2010     No results found for: LDLDIRECT  No results found for: CHOLHDLRATIO    Vitamin D deficiency: Currently is on 5000IU qD. Current complaints include fatigue on daily basis. Last vitamin Dlevel is: 53  No results found for: VD23T, VITD3, VD25, VITD25    Hypertension   Controlled , denies symptoms of dizziness, light headedness. Occasional dependent edema. Tries to follow a salt restricted diet. Lab Results   Component Value Date     07/15/2010    K 5.4 (H) 07/15/2010     07/15/2010    CO2 30 07/15/2010    BUN 23 (H) 07/15/2010    CREATININE 1.2 (H) 07/15/2010    GLUCOSE 155 (H) 07/15/2010    CALCIUM 9.8 07/15/2010    PROT 7.0 07/15/2010    LABALBU 4.3 07/15/2010    BILITOT 0.20 07/15/2010    ALKPHOS 47 07/15/2010    AST 20 07/15/2010    ALT 21 07/15/2010    GFRAA >60 07/15/2010    AGRATIO 1.6 07/15/2010     The ASCVD Risk score (Hannah Gallardo, et al., 2013) failed to calculate for the following reasons:    Cannot find a previous HDL lab    Cannot find a previous total cholesterol lab    History reviewed. No pertinent family history. Review of Systems   Constitutional: Negative for activity change, appetite change, diaphoresis, fever and unexpected weight change. HENT: Negative for dental problem. Eyes: Negative for pain and visual disturbance. Respiratory: Negative for shortness of breath. Cardiovascular: Negative for chest pain, palpitations and leg swelling. Gastrointestinal: Negative for constipation, diarrhea and nausea. Endocrine: Negative for cold intolerance, heat intolerance, polydipsia, polyphagia and polyuria. Genitourinary: Negative for frequency and urgency. Musculoskeletal: Negative for arthralgias, joint swelling and myalgias.    Skin: Negative for color change and lowering of CHO in diet  Medication: no change  Avoid hypoglycemia    Diet :   30-40 grams per meal , 3 meals per day, avoid carbs in snack choices  Include moderate proteins and good fats   Ensure adequate hydration and  electrolyte replacement    Exercise :  Recommended exercise is 5-7 days a week for 30-60 mins at least, per day OR a total of 2.5 hours per week , which ever is more feasible. Has plantars wart in left foot which makes it painful to ambulate    Diabetic Health Maintenance  Follow up with annual eye exams  Follow up with annual podiatry exams if needed  Reviewed sick day management of BG     Other areas of Diabetic Education reviewed:   Carbs: good carbs and bad carbs, importance of carb counting, incorporation of protein with each meal to reduce Glycemic index, importance of portions, Carb/insulin ratio   Fats: Good fats and bad fats, meal planning and supplements.  Discussed how food affects blood sugar readings.  Different diabetic medications   Managing high and low sugar readings   Rotation of sites for subcutaneous medication injection           Relevant Medications    Insulin Glargine, 2 Unit Dial, (TOUJEO MAX SOLOSTAR) 300 UNIT/ML SOPN    Other Relevant Orders    Amb External Referral To Podiatry    Hypothyroidism due to Hashimoto's thyroiditis     Reviewed labs  TFT in range  Continue on Levothyroxine 25 mcg QD             Greater than 35 minutes spent directly counseling patient about topics listed above (such as lifestyle modifications, preventative screenings and/or disease related processes. Return in about 6 months (around 8/10/2021).

## 2021-02-17 NOTE — TELEPHONE ENCOUNTER
Insurance will not cover toujeo max solostar. Preferred formulary Rx are Levemir, Lantus or tresiba.

## 2021-02-19 RX ORDER — INSULIN DEGLUDEC INJECTION 100 U/ML
50 INJECTION, SOLUTION SUBCUTANEOUS DAILY
Qty: 3 PEN | Refills: 5 | Status: SHIPPED | OUTPATIENT
Start: 2021-02-19 | End: 2021-08-11 | Stop reason: ALTCHOICE

## 2021-05-11 ENCOUNTER — TELEPHONE (OUTPATIENT)
Dept: ENDOCRINOLOGY | Age: 61
End: 2021-05-11

## 2021-05-11 DIAGNOSIS — E11.69 DIABETES MELLITUS TYPE 2 IN OBESE (HCC): ICD-10-CM

## 2021-05-11 DIAGNOSIS — E66.9 DIABETES MELLITUS TYPE 2 IN OBESE (HCC): ICD-10-CM

## 2021-05-11 NOTE — TELEPHONE ENCOUNTER
Patient needs a refill on her farxiga 10 mg       [facility], [address] [place] [address] - F [phone]

## 2021-05-22 DIAGNOSIS — E11.69 DIABETES MELLITUS TYPE 2 IN OBESE (HCC): ICD-10-CM

## 2021-05-22 DIAGNOSIS — E66.01 CLASS 2 SEVERE OBESITY WITH SERIOUS COMORBIDITY IN ADULT, UNSPECIFIED BMI, UNSPECIFIED OBESITY TYPE (HCC): ICD-10-CM

## 2021-05-22 DIAGNOSIS — E66.9 DIABETES MELLITUS TYPE 2 IN OBESE (HCC): ICD-10-CM

## 2021-05-24 RX ORDER — SEMAGLUTIDE 1.34 MG/ML
INJECTION, SOLUTION SUBCUTANEOUS
Qty: 3 ML | Refills: 3 | Status: SHIPPED | OUTPATIENT
Start: 2021-05-24 | End: 2021-11-11 | Stop reason: SDUPTHER

## 2021-07-29 DIAGNOSIS — E11.69 DIABETES MELLITUS TYPE 2 IN OBESE (HCC): Primary | ICD-10-CM

## 2021-07-29 DIAGNOSIS — E03.8 HYPOTHYROIDISM DUE TO HASHIMOTO'S THYROIDITIS: ICD-10-CM

## 2021-07-29 DIAGNOSIS — E06.3 HYPOTHYROIDISM DUE TO HASHIMOTO'S THYROIDITIS: ICD-10-CM

## 2021-07-29 DIAGNOSIS — E66.9 DIABETES MELLITUS TYPE 2 IN OBESE (HCC): Primary | ICD-10-CM

## 2021-07-31 ENCOUNTER — HOSPITAL ENCOUNTER (OUTPATIENT)
Dept: ULTRASOUND IMAGING | Age: 61
Discharge: HOME | End: 2021-07-31
Payer: MEDICARE

## 2021-07-31 DIAGNOSIS — R10.11 RUQ ABDOMINAL PAIN: ICD-10-CM

## 2021-07-31 PROCEDURE — 76700 US EXAM ABDOM COMPLETE: CPT

## 2021-08-11 ENCOUNTER — VIRTUAL VISIT (OUTPATIENT)
Dept: ENDOCRINOLOGY | Age: 61
End: 2021-08-11
Payer: MEDICARE

## 2021-08-11 DIAGNOSIS — E66.9 DIABETES MELLITUS TYPE 2 IN OBESE (HCC): ICD-10-CM

## 2021-08-11 DIAGNOSIS — E11.69 DIABETES MELLITUS TYPE 2 IN OBESE (HCC): ICD-10-CM

## 2021-08-11 PROCEDURE — 3046F HEMOGLOBIN A1C LEVEL >9.0%: CPT | Performed by: NURSE PRACTITIONER

## 2021-08-11 PROCEDURE — G8427 DOCREV CUR MEDS BY ELIG CLIN: HCPCS | Performed by: NURSE PRACTITIONER

## 2021-08-11 PROCEDURE — 99213 OFFICE O/P EST LOW 20 MIN: CPT | Performed by: NURSE PRACTITIONER

## 2021-08-11 PROCEDURE — 3017F COLORECTAL CA SCREEN DOC REV: CPT | Performed by: NURSE PRACTITIONER

## 2021-08-11 PROCEDURE — 2022F DILAT RTA XM EVC RTNOPTHY: CPT | Performed by: NURSE PRACTITIONER

## 2021-08-11 RX ORDER — INSULIN LISPRO 100 [IU]/ML
10 INJECTION, SOLUTION INTRAVENOUS; SUBCUTANEOUS
Qty: 5 PEN | Refills: 2 | Status: SHIPPED | OUTPATIENT
Start: 2021-08-11 | End: 2021-10-06

## 2021-08-11 RX ORDER — INSULIN GLARGINE 300 U/ML
50 INJECTION, SOLUTION SUBCUTANEOUS NIGHTLY
Qty: 4 PEN | Refills: 3 | Status: SHIPPED | OUTPATIENT
Start: 2021-08-11 | End: 2021-11-11 | Stop reason: SDUPTHER

## 2021-08-11 ASSESSMENT — ENCOUNTER SYMPTOMS
EYE PAIN: 0
NAUSEA: 0
DIARRHEA: 0
COLOR CHANGE: 0
SHORTNESS OF BREATH: 0
CONSTIPATION: 0

## 2021-08-11 NOTE — PATIENT INSTRUCTIONS
Humalog  Fixed dose: 0  The fixed dose helps to cover the carbs consumed in your food    Sliding scale: The sliding scale covers your blood sugar numbers before you eat    151- 200 Add  2 units  201- 250 Add 4 units  251- 300 Add 6 units   301- 350 Add 8 units    If > 350 : take fixed dose  +8 units, recheck blood sugar in 4 hours and dose according to the sliding scale only    If you are not planning to eat at a specific meal time, you still need to check your blood sugar and dose according to the sliding scale portion ONLY.

## 2021-08-11 NOTE — PROGRESS NOTES
Endocrinology  Ayl Mensah CNP, [address]  989 [facility], [address]  Phone [phone]  Fax [phone]    Jaime Elkins is  being evaluated by a Virtual Visit (video visit) encounter to address concerns as mentioned above. Due to this being a TeleHealth encounter (During SXNGO-[facility] public [facility] emergency), evaluation of the following organ systems was limited: Vitals/Constitutional/EENT/Resp/CV/GI//MS/Neuro/Skin/Heme-Lymph-Imm. Pursuant to the emergency declaration under the [address] [place] [address], [address] [place] [address] authority and the [employer] and [employer] Act, this Virtual Visit was conducted with patient's (and/or legal guardian's) consent, to reduce the patient's risk of exposure to COVID-19 and provide necessary medical care. The patient (and/or legal guardian) has also been advised to contact this office for worsening conditions or problems, and seek emergency medical treatment and/or call 911 if deemed necessary. Services were provided through a video synchronous discussion virtually to substitute for in-person clinic visit. Patient and provider were located at their individual homes    Patient was identified via name,  on the video visit    Jaime Elkins is a 59 y.o. female who is a new patient following up for management of Diabetes Mellitus Type 2.     PCP: MARY Hercules    A1c: 6.8%--> 6.5%--> 6.5%--> 6.7%--> 7%    Last BP Readings:  BP Readings from Last 3 Encounters:   02/10/21 118/78   10/09/19 121/75   19 109/72     Last LDL:   Lab Results   Component Value Date    LDLCALC 87 07/15/2010     Aspirin Use: 81 mg    Tobacco/Alcohol History:    Smoking status: Current Every Day Smoker     Packs/day: 1.00     Types: Cigarettes    Smokeless tobacco: Never Used    Alcohol use: Not on file    Drug use: Not on file     HPI on 2/10/2021  Reports no new concerns  Has a plantars wart, preventing her from exercise  Continues to titrate tresiba @ 40 units QHS  About 10 lbs weight loss: on low CHO diet      Diabetes:  Camelia Neil  has been diabetic for 15  years and on insulin for  10 years. Patient reports that diabetes is currently uncontrolled. Disease course has been variable  Current microvascular complications include none  Current Macrovascular complications include none; no h/o CAD, PVD  Patient reports compliance for about 90% of the time and adheres to medication, but usually not to diet and exercise instructions. There have been no recent hospital or ED admissions for hypoglycemia, hyperglycemia or DKA. Thyroid:   H/o hypothyroidism symptoms for 2-3 years and is currently on 25 mcg QD  Current symptoms include fatigue, feeling cold and cold intolerance, constipation, anxiousness, depression, ocular symptoms. Significant SI /depression in past, is doing well on Cymbalta per PCP.   Patient denies diarrhea, goiter,  She is > 15 years postmenopausal.     Obstructive symptoms  - Change in voice: hoarse  - Trouble swallowing: none    Predisposing factors for thyroid disease  Exposure to radiation (neck): no  Exposure to iodine: none, [place]  FH of thyroid disease: sister, niece on synthroid  FH of thyroid or other cancers: mom ( lymphoma)     Lab Results   Component Value Date    TSH 2.44 05/01/2020    TSH 9.11 01/25/2019    TSH 2.76 07/15/2010    FT3 3.1 01/25/2019    T4FREE 1.4 05/01/2020    T4FREE 0.8 01/25/2019     PMH includes  Past Medical History:   Diagnosis Date    Diabetes mellitus type 2 in nonobese ([address]     High blood pressure     Hyperlipidemia      Blood glucose trends:  Patient brought log glucometer for download  Readings per day  2 per patient report  Average reading 200s  Lowest in past 2 weeks 55  Highest in past 2 weeks > 250  Hypoglycemia awareness and symptoms: shaky, off balance  Has not done CGM    Current Medication regimen:   Farxiga 10 mg QD  Ozempic 0.5 mg-->has s/e @ 1 mg  Toujeo 70 units QHS--> 58 units--> 40 units    Other meds tried in past: 2;  Basaglar: 70-80 units BID--> takes about 146 units;  Januvia 100 mg stopped when switched to GLP1  GFR > 60    Current Dietary regimen:   BF: no  Lunch: potatoes, cheese, chicken-->   Dinner: usually skips  Snacks: potato chips/cheese  Beverages: crystal light  Average carbs per day 20-40   Usual carbs per meal    Microvascular Complications:  · Neuropathy: denies concerns; has lower back pain and sciatica. · Retinopathy: no concerns with vision, field of vision  · Nephropathy: no albuminuria ( see media labs). Diabetic Health Maintenance   · Last Eye Exam: > 6 months ago, h/o eye surgery for astigmatism, \"glaucoma suspect\"  · Last Foot exam: no concerns, has neuropathy r/t back pain  · Has patient seen a dietitian? Yes  · Current Exercise: No structured exercise  · On ACEI or ARB: Prinzide 10 -12.5 mg  · On statin: Lipitor 40 mg    Hyperlipidemia: Current complaints include occasional myalgias but otherwise tolerates well. Lab Results   Component Value Date    CHOL 155 07/15/2010     Lab Results   Component Value Date    TRIG 157 07/15/2010     Lab Results   Component Value Date    HDL 37 07/15/2010     Lab Results   Component Value Date    LDLCALC 87 07/15/2010     No results found for: LDLDIRECT  No results found for: CHOLHDLRATIO    Vitamin D deficiency: Currently is on 5000IU qD. Current complaints include fatigue on daily basis. Last vitamin Dlevel is: 53  No results found for: VD23T, VITD3, VD25, VITD25    Hypertension   Controlled , denies symptoms of dizziness, light headedness. Occasional dependent edema. Tries to follow a salt restricted diet.    Lab Results   Component Value Date     07/15/2010    K 5.4 (H) 07/15/2010     07/15/2010    CO2 30 07/15/2010    BUN 23 (H) 07/15/2010    CREATININE 1.2 (H) 07/15/2010 GLUCOSE 155 (H) 07/15/2010    CALCIUM 9.8 07/15/2010    PROT 7.0 07/15/2010    LABALBU 4.3 07/15/2010    BILITOT 0.20 07/15/2010    ALKPHOS 47 07/15/2010    AST 20 07/15/2010    ALT 21 07/15/2010    GFRAA >60 07/15/2010    AGRATIO 1.6 07/15/2010     The ASCVD Risk score (Traci Diallo., et al., 2013) failed to calculate for the following reasons:    Cannot find a previous HDL lab    Cannot find a previous total cholesterol lab    History reviewed. No pertinent family history. Review of Systems   Constitutional: Negative for activity change, appetite change, diaphoresis, fever and unexpected weight change. HENT: Negative for dental problem. Eyes: Negative for pain and visual disturbance. Respiratory: Negative for shortness of breath. Cardiovascular: Negative for chest pain, palpitations and leg swelling. Gastrointestinal: Negative for constipation, diarrhea and nausea. Endocrine: Negative for cold intolerance, heat intolerance, polydipsia, polyphagia and polyuria. Genitourinary: Negative for frequency and urgency. Musculoskeletal: Negative for arthralgias, joint swelling and myalgias. Skin: Negative for color change and pallor. Neurological: Negative for weakness, numbness and headaches. Psychiatric/Behavioral: Negative for dysphoric mood and sleep disturbance. The patient is not nervous/anxious. There were no vitals filed for this visit. televisit    Physical Exam  Vitals reviewed. Constitutional:       Appearance: She is well-developed. Eyes:      Pupils: Pupils are equal, round, and reactive to light. Neck:      Thyroid: No thyromegaly. Cardiovascular:      Rate and Rhythm: Normal rate and regular rhythm. Heart sounds: Normal heart sounds. Pulmonary:      Effort: Pulmonary effort is normal.      Breath sounds: Normal breath sounds. Abdominal:      General: There is no distension. Musculoskeletal:         General: Normal range of motion.       Cervical back: Normal range of motion. Skin:     General: Skin is warm and dry. Comments: No skin changes or evidence of trauma. Neurological:      Mental Status: She is alert and oriented to person, place, and time. Sensory: No sensory deficit. Psychiatric:         Behavior: Behavior normal.         Thought Content: Thought content normal.        Skeletal foot exam:   Plantars wart on left foot     Assessment  Abiola Bermudez is a 59 y.o. female with uncontrolled Diabetes Mellitus type 2 complicated by neuropathy and associated with Hashimoto's hypothyroidism. Plan  Problem List Items Addressed This Visit     Diabetes mellitus type 2 in obese [facility])        Lab Results   Component Value Date    LABA1C 6.6 05/01/2020     Goal A1c  < 6.5%  A1c is 7 in recent labs  R/t stress and associated dietary non compliance per patient  Avoid hypoglycemia    Medication: no change, will improve diet and make necessary lifestyle changes  Discussed titration of FBG to  with Toujeo  Humalog : cover prandial BG with medium dose SSI  Continue ozempic and trulicity as currently    Diet :   30-40 grams per meal , 3 meals per day, avoid carbs in snack choices  Include moderate proteins and good fats   Ensure adequate hydration and  electrolyte replacement    Exercise :  Recommended exercise is 5-7 days a week for 30-60 mins at least, per day OR a total of 2.5 hours per week , which ever is more feasible. Diabetic Health Maintenance  Follow up with annual eye exams  Follow up with annual podiatry exams if needed  Reviewed sick day management of BG     Other areas of Diabetic Education reviewed:   Carbs: good carbs and bad carbs, importance of carb counting, incorporation of protein with each meal to reduce Glycemic index, importance of portions, Carb/insulin ratio   Fats: Good fats and bad fats, meal planning and supplements.  Discussed how food affects blood sugar readings.     Different diabetic medications   Managing high and low sugar readings   Rotation of sites for subcutaneous medication injection           Relevant Medications    Insulin Glargine, 2 Unit Dial, (TOUJEO MAX SOLOSTAR) 300 UNIT/ML SOPN    insulin lispro, 1 Unit Dial, (HUMALOG KWIKPEN) 100 UNIT/ML SOPN        Return in about 6 months (around 2/11/2022).

## 2021-08-12 NOTE — ASSESSMENT & PLAN NOTE
Lab Results   Component Value Date    LABA1C 6.6 05/01/2020     Goal A1c  < 6.5%  A1c is 7 in recent labs  R/t stress and associated dietary non compliance per patient  Avoid hypoglycemia    Medication: no change, will improve diet and make necessary lifestyle changes  Discussed titration of FBG to  with Toujeo  Humalog : cover prandial BG with medium dose SSI  Continue ozempic and trulicity as currently    Diet :   30-40 grams per meal , 3 meals per day, avoid carbs in snack choices  Include moderate proteins and good fats   Ensure adequate hydration and  electrolyte replacement    Exercise :  Recommended exercise is 5-7 days a week for 30-60 mins at least, per day OR a total of 2.5 hours per week , which ever is more feasible. Diabetic Health Maintenance  Follow up with annual eye exams  Follow up with annual podiatry exams if needed  Reviewed sick day management of BG     Other areas of Diabetic Education reviewed:   Carbs: good carbs and bad carbs, importance of carb counting, incorporation of protein with each meal to reduce Glycemic index, importance of portions, Carb/insulin ratio   Fats: Good fats and bad fats, meal planning and supplements.  Discussed how food affects blood sugar readings.     Different diabetic medications   Managing high and low sugar readings   Rotation of sites for subcutaneous medication injection

## 2021-08-23 ENCOUNTER — HOSPITAL ENCOUNTER (OUTPATIENT)
Dept: NUCLEAR MEDICINE | Age: 61
Discharge: HOME | End: 2021-08-23
Payer: MEDICARE

## 2021-08-23 DIAGNOSIS — R10.11 ABDOMINAL PAIN, RIGHT UPPER QUADRANT: ICD-10-CM

## 2021-08-23 PROCEDURE — A9537 TC99M MEBROFENIN: HCPCS | Performed by: NURSE PRACTITIONER

## 2021-08-23 PROCEDURE — 3430000000 HC RX DIAGNOSTIC RADIOPHARMACEUTICAL: Performed by: NURSE PRACTITIONER

## 2021-08-23 PROCEDURE — 78227 HEPATOBIL SYST IMAGE W/DRUG: CPT

## 2021-08-23 RX ADMIN — Medication 5.2 MILLICURIE: at 07:52

## 2021-08-31 ENCOUNTER — HOSPITAL ENCOUNTER (OUTPATIENT)
Dept: GENERAL RADIOLOGY | Age: 61
Discharge: HOME | End: 2021-08-31
Payer: MEDICARE

## 2021-08-31 ENCOUNTER — HOSPITAL ENCOUNTER (OUTPATIENT)
Age: 61
Discharge: HOME | End: 2021-08-31
Payer: MEDICARE

## 2021-08-31 DIAGNOSIS — R07.89 CHEST WALL PAIN: ICD-10-CM

## 2021-08-31 PROCEDURE — 71046 X-RAY EXAM CHEST 2 VIEWS: CPT

## 2021-09-03 DIAGNOSIS — E66.9 DIABETES MELLITUS TYPE 2 IN OBESE (HCC): ICD-10-CM

## 2021-09-03 DIAGNOSIS — E11.69 DIABETES MELLITUS TYPE 2 IN OBESE (HCC): ICD-10-CM

## 2021-09-07 ENCOUNTER — HOSPITAL ENCOUNTER (OUTPATIENT)
Age: 61
Discharge: HOME | End: 2021-09-07
Payer: MEDICARE

## 2021-09-07 ENCOUNTER — HOSPITAL ENCOUNTER (OUTPATIENT)
Dept: CT IMAGING | Age: 61
Discharge: HOME | End: 2021-09-07
Payer: MEDICARE

## 2021-09-07 DIAGNOSIS — R10.11 ABDOMINAL PAIN, RIGHT UPPER QUADRANT: ICD-10-CM

## 2021-09-07 LAB
BUN BLDV-MCNC: 17 MG/DL (ref 7–20)
CREAT SERPL-MCNC: 1 MG/DL (ref 0.6–1.2)
GFR AFRICAN AMERICAN: >60
GFR NON-AFRICAN AMERICAN: 56

## 2021-09-07 PROCEDURE — 6360000004 HC RX CONTRAST MEDICATION: Performed by: NURSE PRACTITIONER

## 2021-09-07 PROCEDURE — 74160 CT ABDOMEN W/CONTRAST: CPT

## 2021-09-07 PROCEDURE — 36415 COLL VENOUS BLD VENIPUNCTURE: CPT

## 2021-09-07 PROCEDURE — 84520 ASSAY OF UREA NITROGEN: CPT

## 2021-09-07 PROCEDURE — 82565 ASSAY OF CREATININE: CPT

## 2021-09-07 RX ADMIN — IOHEXOL 50 ML: 240 INJECTION, SOLUTION INTRATHECAL; INTRAVASCULAR; INTRAVENOUS; ORAL at 07:23

## 2021-09-07 RX ADMIN — IOPAMIDOL 75 ML: 755 INJECTION, SOLUTION INTRAVENOUS at 07:23

## 2021-09-07 NOTE — TELEPHONE ENCOUNTER
Medication:   Requested Prescriptions     Pending Prescriptions Disp Refills    dapagliflozin (FARXIGA) 10 MG tablet [Pharmacy Med Name: FARXIGA 10MG TABLETS] 90 tablet 0     Sig: TAKE 1 TABLET BY MOUTH EVERY MORNING       Last Filled:      Patient Phone Number: [phone] (home) [phone] (work)    Last appt: Visit date not found   Next appt: 11/11/2021    Last Labs DM:   Lab Results   Component Value Date    LABA1C 6.6 05/01/2020

## 2021-09-21 ENCOUNTER — HOSPITAL ENCOUNTER (OUTPATIENT)
Dept: MRI IMAGING | Age: 61
Discharge: HOME | End: 2021-09-21
Payer: MEDICARE

## 2021-09-21 DIAGNOSIS — N28.89 RENAL MASS: ICD-10-CM

## 2021-09-21 PROCEDURE — A9579 GAD-BASE MR CONTRAST NOS,1ML: HCPCS | Performed by: NURSE PRACTITIONER

## 2021-09-21 PROCEDURE — 6360000004 HC RX CONTRAST MEDICATION: Performed by: NURSE PRACTITIONER

## 2021-09-21 PROCEDURE — 74183 MRI ABD W/O CNTR FLWD CNTR: CPT

## 2021-09-21 RX ADMIN — GADOTERIDOL 15 ML: 279.3 INJECTION, SOLUTION INTRAVENOUS at 09:12

## 2021-09-29 NOTE — PROGRESS NOTES
Marvinraoalexy Bashir    Age 59 y.o.    female    1960    MRN [id number]    10/12/2021  Arrival Time_____________  OR Time____________[address] [employer]     Procedure(s):  DAVINCI RIGHT PARTIAL NEPHRECTOMY WITH ULTRASONIC PROBE                      General     Surgeon(s):  Veronica Umanzor, MD      DAY ADMIT ___  SDS/OP ___  OUTPT IN BED ___         Phone [phone] (home) [phone] (work)   PCP _____________________ Phone_________________ [address] ( ) Epic CE ( ) Appt ________    ADDITIONAL INFO __________________________________ Cardio/Consult _____________    NOTES _____________________________________________________________________    ____________________________________________________________________________    PAT APPT DATE:________ TIME: ________  FAXED QAD: _______  (__) H&P w/ hospitalist  ____________________________________________________________________________    COVID TEST: Date/Location______________        NURSING HISTORY COMPLETE: _______  (__) CBC       (__) W/ DIFF ___________  (__)  ECHO    __________  (__) Hgb A1C    ___________  (__) CHEST X RAY   __________  (__) LIPID PROFILE  ___________  (__) EKG   __________  (__) PT/PTT   ___________  (__) PFT's   __________  (__) BMP   ___________  (__) CAROTIDS  __________  (__) CMP   ___________  (__) VEIN MAPPING  __________  (__) U/A   ___________  (__) HISTORY & PHYSICAL __________  (__) URINE C & S  ___________  (__) CARDIAC CLEARANCE __________  (__) U/A W/ FLEX  ___________  (__) PULM.  CLEARANCE __________  (__) SERUM PREGNANCY ___________  (__) Check Epic DOS orders __________  (__) TYPE & SCREEN ________ repeat ( ) (__)  __________________ __________  (__) ALBUMIN   ___________  (__)  __________________ __________  (__) TRANSFERRIN  ___________  (__)  __________________ __________  (__) LIVER PROFILE  ___________  (__)  __________________ __________  (__) CARBOXY HGB  ___________  (__) URINE PREG DOS __________  (__) NICOTINE & MET.  ___________  (__) BLOOD SUGAR DOS __________  (__) PREALBUMIN  ___________    (__) MRSA NASAL SWAB ___________  (__) BLOOD THINNERS __________  (__) ACE/ ARBS: _____________________    (__) BETABLOCKERS ___________________

## 2021-10-04 ENCOUNTER — ANESTHESIA EVENT (OUTPATIENT)
Dept: OPERATING ROOM | Age: 61
DRG: 658 | End: 2021-10-04
Payer: MEDICARE

## 2021-10-07 ENCOUNTER — HOSPITAL ENCOUNTER (OUTPATIENT)
Dept: PREADMISSION TESTING | Age: 61
Discharge: HOME | End: 2021-10-11
Payer: MEDICARE

## 2021-10-07 LAB
ABO/RH: NORMAL
ANION GAP SERPL CALCULATED.3IONS-SCNC: 12 MMOL/L (ref 3–16)
ANTIBODY SCREEN: NORMAL
BASOPHILS ABSOLUTE: 0.1 K/UL (ref 0–0.2)
BASOPHILS RELATIVE PERCENT: 0.6 %
BILIRUBIN URINE: NEGATIVE
BLOOD, URINE: ABNORMAL
BUN BLDV-MCNC: 19 MG/DL (ref 7–20)
CALCIUM SERPL-MCNC: 9.8 MG/DL (ref 8.3–10.6)
CHLORIDE BLD-SCNC: 102 MMOL/L (ref 99–110)
CLARITY: CLEAR
CO2: 25 MMOL/L (ref 21–32)
COLOR: YELLOW
CREAT SERPL-MCNC: 0.8 MG/DL (ref 0.6–1.2)
EOSINOPHILS ABSOLUTE: 0.1 K/UL (ref 0–0.6)
EOSINOPHILS RELATIVE PERCENT: 1.6 %
EPITHELIAL CELLS, UA: NORMAL /HPF (ref 0–5)
GFR AFRICAN AMERICAN: >60
GFR NON-AFRICAN AMERICAN: >60
GLUCOSE BLD-MCNC: 118 MG/DL (ref 70–99)
GLUCOSE URINE: >=1000 MG/DL
HCT VFR BLD CALC: 45.8 % (ref 36–48)
HEMOGLOBIN: 15.2 G/DL (ref 12–16)
KETONES, URINE: NEGATIVE MG/DL
LEUKOCYTE ESTERASE, URINE: NEGATIVE
LYMPHOCYTES ABSOLUTE: 2.3 K/UL (ref 1–5.1)
LYMPHOCYTES RELATIVE PERCENT: 25.2 %
MCH RBC QN AUTO: 31.1 PG (ref 26–34)
MCHC RBC AUTO-ENTMCNC: 33.2 G/DL (ref 31–36)
MCV RBC AUTO: 93.7 FL (ref 80–100)
MICROSCOPIC EXAMINATION: YES
MONOCYTES ABSOLUTE: 0.6 K/UL (ref 0–1.3)
MONOCYTES RELATIVE PERCENT: 6.5 %
NEUTROPHILS ABSOLUTE: 6.1 K/UL (ref 1.7–7.7)
NEUTROPHILS RELATIVE PERCENT: 66.1 %
NITRITE, URINE: NEGATIVE
PDW BLD-RTO: 13.3 % (ref 12.4–15.4)
PH UA: 6 (ref 5–8)
PLATELET # BLD: 199 K/UL (ref 135–450)
PMV BLD AUTO: 9 FL (ref 5–10.5)
POTASSIUM SERPL-SCNC: 5.3 MMOL/L (ref 3.5–5.1)
PROTEIN UA: NEGATIVE MG/DL
RBC # BLD: 4.89 M/UL (ref 4–5.2)
RBC UA: NORMAL /HPF (ref 0–4)
SODIUM BLD-SCNC: 139 MMOL/L (ref 136–145)
SPECIFIC GRAVITY UA: 1.01 (ref 1–1.03)
URINE TYPE: ABNORMAL
UROBILINOGEN, URINE: 0.2 E.U./DL
WBC # BLD: 9.1 K/UL (ref 4–11)
WBC UA: NORMAL /HPF (ref 0–5)

## 2021-10-07 PROCEDURE — 93005 ELECTROCARDIOGRAM TRACING: CPT | Performed by: UROLOGY

## 2021-10-07 PROCEDURE — U0005 INFEC AGEN DETEC AMPLI PROBE: HCPCS

## 2021-10-07 PROCEDURE — 81001 URINALYSIS AUTO W/SCOPE: CPT

## 2021-10-07 PROCEDURE — 86850 RBC ANTIBODY SCREEN: CPT

## 2021-10-07 PROCEDURE — 85025 COMPLETE CBC W/AUTO DIFF WBC: CPT

## 2021-10-07 PROCEDURE — 36415 COLL VENOUS BLD VENIPUNCTURE: CPT

## 2021-10-07 PROCEDURE — 86900 BLOOD TYPING SEROLOGIC ABO: CPT

## 2021-10-07 PROCEDURE — 80048 BASIC METABOLIC PNL TOTAL CA: CPT

## 2021-10-07 PROCEDURE — 87086 URINE CULTURE/COLONY COUNT: CPT

## 2021-10-07 PROCEDURE — U0003 INFECTIOUS AGENT DETECTION BY NUCLEIC ACID (DNA OR RNA); SEVERE ACUTE RESPIRATORY SYNDROME CORONAVIRUS 2 (SARS-COV-2) (CORONAVIRUS DISEASE [COVID-19]), AMPLIFIED PROBE TECHNIQUE, MAKING USE OF HIGH THROUGHPUT TECHNOLOGIES AS DESCRIBED BY CMS-2020-01-R: HCPCS

## 2021-10-07 PROCEDURE — 86901 BLOOD TYPING SEROLOGIC RH(D): CPT

## 2021-10-08 LAB
EKG ATRIAL RATE: 75 BPM
EKG DIAGNOSIS: NORMAL
EKG P AXIS: 62 DEGREES
EKG P-R INTERVAL: 166 MS
EKG Q-T INTERVAL: 402 MS
EKG QRS DURATION: 88 MS
EKG QTC CALCULATION (BAZETT): 448 MS
EKG R AXIS: 54 DEGREES
EKG T AXIS: 58 DEGREES
EKG VENTRICULAR RATE: 75 BPM
SARS-COV-2: NOT DETECTED
URINE CULTURE, ROUTINE: NORMAL

## 2021-10-08 PROCEDURE — 93010 ELECTROCARDIOGRAM REPORT: CPT | Performed by: INTERNAL MEDICINE

## 2021-10-11 NOTE — ANESTHESIA PRE PROCEDURE
Department of Anesthesiology  Preprocedure Note       Name:  Cheng Bashir   Age:  59 y.o.  :  1960                                          MRN:  [id number]         Date:  10/11/2021      Surgeon: Sabrina Collier):  Veronica Umanzor MD    Procedure: Procedure(s):  DAVINCI RIGHT PARTIAL NEPHRECTOMY WITH ULTRASONIC PROBE    Medications prior to admission:   Prior to Admission medications    Medication Sig Start Date End Date Taking?  Authorizing Provider   dapagliflozin (FARXIGA) 10 MG tablet TAKE 1 TABLET BY MOUTH EVERY MORNING 21   Kadi Hinton APRN - CNP   Insulin Glargine, 2 Unit Dial, (TOUJEO MAX SOLOSTAR) 300 UNIT/ML SOPN Inject 50 Units into the skin nightly  Patient taking differently: Inject 44 Units into the skin nightly  21   NITA He - CNP   Semaglutide,0.25 or 0.5MG/DOS, (OZEMPIC, 0.25 OR 0.5 MG/DOSE,) 2 MG/1.5ML SOPN INJECT 0.5MG INTO THE SKIN ONCE WEEKLY  Patient taking differently: Inject into the skin once a week INJECT 0.5MG INTO THE SKIN ONCE WEEKLY/Sundays 5/24/21   Kadi Hinton APRN - CNP   lisinopril (PRINIVIL;ZESTRIL) 10 MG tablet Take 10 mg by mouth daily  21   Historical Provider, MD   blood glucose test strips (ONETOUCH ULTRA) strip USE TO TEST BLOOD SUGAR TWICE DAILY AS DIRECTED 21   Kadi Hinton APRN - CNP   Insulin Pen Needle 32G X 4 MM MISC 1 each by Does not apply route daily 20   Juan Daniel MD   Insulin Pen Needle (B-D UF III MINI PEN NEEDLES) 31G X 5 MM MISC Use for toujeo 3 times daily plus with ozempic once a week 20   Kadi Hinton, APRN - CNP   Semaglutide,0.25 or 0.5MG/DOS, (OZEMPIC, 0.25 OR 0.5 MG/DOSE,) 2 MG/1.5ML SOPN Inject 0.5MG into skin once weekly 10/5/20   Kadi Hinton APRN - CNP   levothyroxine (SYNTHROID) 25 MCG tablet Take 1 tablet by mouth Daily 3/20/19   Kadi Hinton APRN - CNP   DULoxetine (CYMBALTA) 30 MG extended release capsule Take 30 mg by mouth nightly     Historical Provider, MD   atorvastatin (LIPITOR) 40 MG tablet Take 40 mg by mouth nightly     Historical Provider, MD   aspirin 81 MG tablet Take 81 mg by mouth nightly     Historical Provider, MD   loratadine (CLARITIN) 10 MG capsule Take 10 mg by mouth daily    Historical Provider, MD   docusate sodium (COLACE) 100 MG capsule Take 100 mg by mouth nightly     Historical Provider, MD       Current medications:    No current facility-administered medications for this encounter. Current Outpatient Medications   Medication Sig Dispense Refill    dapagliflozin (FARXIGA) 10 MG tablet TAKE 1 TABLET BY MOUTH EVERY MORNING 90 tablet 0    Insulin Glargine, 2 Unit Dial, (TOUJEO MAX SOLOSTAR) 300 UNIT/ML SOPN Inject 50 Units into the skin nightly (Patient taking differently: Inject 44 Units into the skin nightly ) 4 pen 3    Semaglutide,0.25 or 0.5MG/DOS, (OZEMPIC, 0.25 OR 0.5 MG/DOSE,) 2 MG/1.5ML SOPN INJECT 0.5MG INTO THE SKIN ONCE WEEKLY (Patient taking differently: Inject into the skin once a week INJECT 0.5MG INTO THE SKIN ONCE WEEKLY/Sundays) 3 mL 3    lisinopril (PRINIVIL;ZESTRIL) 10 MG tablet Take 10 mg by mouth daily       blood glucose test strips (ONETOUCH ULTRA) strip USE TO TEST BLOOD SUGAR TWICE DAILY AS DIRECTED 100 strip 3    Insulin Pen Needle 32G X 4 MM MISC 1 each by Does not apply route daily 100 each 3    Insulin Pen Needle (B-D UF III MINI PEN NEEDLES) 31G X 5 MM MISC Use for toujeo 3 times daily plus with ozempic once a week 100 each 2    levothyroxine (SYNTHROID) 25 MCG tablet Take 1 tablet by mouth Daily 30 tablet 3    DULoxetine (CYMBALTA) 30 MG extended release capsule Take 30 mg by mouth nightly       atorvastatin (LIPITOR) 40 MG tablet Take 40 mg by mouth nightly       aspirin 81 MG tablet Take 81 mg by mouth nightly       loratadine (CLARITIN) 10 MG capsule Take 10 mg by mouth daily      docusate sodium (COLACE) 100 MG capsule Take 100 mg by mouth nightly          Allergies:     Allergies   Allergen Reactions    Metformin And Related Diarrhea    Travoprost      Eye pain, deep red       Problem List:    Patient Active Problem List   Diagnosis Code    Back arthralgia M54.9    Hypothyroidism due to Hashimoto's thyroiditis E03.8, E06.3    Diabetes mellitus type 2 in obese (HCC) E11.69, E66.9    Class 2 obesity in adult E66.9       Past Medical History:        Diagnosis Date    Allergic rhinitis     Arthritis     Diabetes mellitus type 2 in nonobese ([address]     High blood pressure     Hyperlipidemia     Neoplasm of right kidney        Past Surgical History:        Procedure Laterality Date    ANKLE FRACTURE SURGERY      BACK SURGERY      \"mid-back\" @     CATARACT REMOVAL WITH IMPLANT Bilateral     DILATION AND CURETTAGE OF UTERUS      EYE SURGERY      KNEE SURGERY      TONSILLECTOMY         Social History:    Social History     Tobacco Use    Smoking status: Current Every Day Smoker     Packs/day: 1.00     Years: 44.00     Pack years: 44.00     Types: Cigarettes    Smokeless tobacco: Never Used   Substance Use Topics    Alcohol use: Never                                Ready to quit: Not Answered  Counseling given: Yes      Vital Signs (Current):   Vitals:    10/05/21 1323   Weight: 174 lb (78.9 kg)   Height: 5' 3\" (1.6 m)                                              BP Readings from Last 3 Encounters:   02/10/21 118/78   10/09/19 121/75   07/03/19 109/72       NPO Status:                                                                                 BMI:   Wt Readings from Last 3 Encounters:   02/10/21 177 lb 6.4 oz (80.5 kg)   10/09/19 181 lb 3.2 oz (82.2 kg)   07/03/19 196 lb 9.6 oz (89.2 kg)     Body mass index is 30.82 kg/m².     CBC:   Lab Results   Component Value Date    WBC 9.1 10/07/2021    RBC 4.89 10/07/2021    HGB 15.2 10/07/2021    HCT 45.8 10/07/2021    MCV 93.7 10/07/2021    RDW 13.3 10/07/2021     10/07/2021       CMP:   Lab Results   Component Value Date     10/07/2021    K 5.3 10/07/2021     10/07/2021    CO2 25 10/07/2021    BUN 19 10/07/2021    CREATININE 0.8 10/07/2021    GFRAA >60 10/07/2021    GFRAA >60 07/15/2010    AGRATIO 1.6 07/15/2010    LABGLOM >60 10/07/2021    GLUCOSE 118 10/07/2021    PROT 7.0 07/15/2010    CALCIUM 9.8 10/07/2021    BILITOT 0.20 07/15/2010    ALKPHOS 47 07/15/2010    AST 20 07/15/2010    ALT 21 07/15/2010       POC Tests: No results for input(s): POCGLU, POCNA, POCK, POCCL, POCBUN, POCHEMO, POCHCT in the last 72 hours. Coags: No results found for: PROTIME, INR, APTT    HCG (If Applicable): No results found for: PREGTESTUR, PREGSERUM, HCG, HCGQUANT     ABGs: No results found for: PHART, PO2ART, WWH5YBJ, TEA7ECH, BEART, A1TESYIF     Type & Screen (If Applicable):  No results found for: LABABO, LABRH    Drug/Infectious Status (If Applicable):  No results found for: HIV, HEPCAB    COVID-19 Screening (If Applicable):   Lab Results   Component Value Date    COVID19 Not Detected 10/07/2021           Anesthesia Evaluation  Patient summary reviewed and Nursing notes reviewed no history of anesthetic complications:   Airway: Mallampati: II     Neck ROM: full   Dental:          Pulmonary:Negative Pulmonary ROS and normal exam                               Cardiovascular:Negative CV ROS    (+) hypertension:, hyperlipidemia                  Neuro/Psych:   Negative Neuro/Psych ROS              GI/Hepatic/Renal: Neg GI/Hepatic/Renal ROS       (-) hiatal hernia and GERD       Endo/Other: Negative Endo/Other ROS   (+) Diabetes, hypothyroidism: arthritis:., .                 Abdominal:             Vascular: Other Findings:           Anesthesia Plan      general     ASA 2     (I discussed with the patient the risks and benefits of PIV, general anesthesia, IV Narcotics, PACU. All questions were answered the patient agrees with the plan and wishes to proceed.  )  Induction: intravenous.                   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   BMI:  Body mass index is 30.82 kg/m².      Vitals:    10/05/21 1323 10/12/21 1041   BP:  123/65   Pulse:  76   Resp:  16   Temp:  97.6 °F (36.4 °C)   TempSrc:  Temporal   SpO2:  97%   Weight: 174 lb (78.9 kg)    Height: 5' 3\" (1.6 m)        Allergies   Allergen Reactions    Metformin And Related Diarrhea    Travoprost      Eye pain, deep red       Social History     Tobacco Use    Smoking status: Current Every Day Smoker     Packs/day: 1.00     Years: 44.00     Pack years: 44.00     Types: Cigarettes    Smokeless tobacco: Never Used   Substance Use Topics    Alcohol use: Never       LABS:    CBC  Lab Results   Component Value Date/Time    WBC 9.1 10/07/2021 09:07 AM    HGB 15.2 10/07/2021 09:07 AM    HCT 45.8 10/07/2021 09:07 AM     10/07/2021 09:07 AM     RENAL  Lab Results   Component Value Date/Time     10/07/2021 09:07 AM    K 5.3 (H) 10/07/2021 09:07 AM     10/07/2021 09:07 AM    CO2 25 10/07/2021 09:07 AM    BUN 19 10/07/2021 09:07 AM    CREATININE 0.8 10/07/2021 09:07 AM    GLUCOSE 118 (H) 10/07/2021 09:07 AM     COAGS  No results found for: PROTIME, INR, APTT    Asael Gonzales MD   10/11/2021

## 2021-10-12 ENCOUNTER — ANESTHESIA (OUTPATIENT)
Dept: OPERATING ROOM | Age: 61
DRG: 658 | End: 2021-10-12
Payer: MEDICARE

## 2021-10-12 ENCOUNTER — HOSPITAL ENCOUNTER (INPATIENT)
Age: 61
LOS: 2 days | Discharge: HOME | DRG: 658 | End: 2021-10-14
Attending: UROLOGY | Admitting: UROLOGY
Payer: MEDICARE

## 2021-10-12 VITALS
OXYGEN SATURATION: 100 % | TEMPERATURE: 95.4 F | SYSTOLIC BLOOD PRESSURE: 138 MMHG | RESPIRATION RATE: 35 BRPM | DIASTOLIC BLOOD PRESSURE: 75 MMHG

## 2021-10-12 DIAGNOSIS — D41.01 NEOPLASM OF UNCERTAIN BEHAVIOR OF RIGHT KIDNEY: ICD-10-CM

## 2021-10-12 PROBLEM — N28.89 RIGHT RENAL MASS: Status: ACTIVE | Noted: 2021-10-12

## 2021-10-12 LAB
ABO/RH: NORMAL
ANION GAP SERPL CALCULATED.3IONS-SCNC: 9 MMOL/L (ref 3–16)
ANTIBODY SCREEN: NORMAL
BUN BLDV-MCNC: 11 MG/DL (ref 7–20)
CALCIUM SERPL-MCNC: 9.4 MG/DL (ref 8.3–10.6)
CHLORIDE BLD-SCNC: 105 MMOL/L (ref 99–110)
CO2: 24 MMOL/L (ref 21–32)
CREAT SERPL-MCNC: 0.7 MG/DL (ref 0.6–1.2)
GFR AFRICAN AMERICAN: >60
GFR NON-AFRICAN AMERICAN: >60
GLUCOSE BLD-MCNC: 102 MG/DL (ref 70–99)
GLUCOSE BLD-MCNC: 139 MG/DL (ref 70–99)
GLUCOSE BLD-MCNC: 169 MG/DL (ref 70–99)
GLUCOSE BLD-MCNC: 94 MG/DL (ref 70–99)
HCT VFR BLD CALC: 41.1 % (ref 36–48)
HEMOGLOBIN: 14.2 G/DL (ref 12–16)
MCH RBC QN AUTO: 31.4 PG (ref 26–34)
MCHC RBC AUTO-ENTMCNC: 34.5 G/DL (ref 31–36)
MCV RBC AUTO: 90.9 FL (ref 80–100)
PDW BLD-RTO: 13.2 % (ref 12.4–15.4)
PERFORMED ON: ABNORMAL
PLATELET # BLD: 192 K/UL (ref 135–450)
PMV BLD AUTO: 7.8 FL (ref 5–10.5)
POTASSIUM REFLEX MAGNESIUM: 4.1 MMOL/L (ref 3.5–5.1)
RBC # BLD: 4.52 M/UL (ref 4–5.2)
SODIUM BLD-SCNC: 138 MMOL/L (ref 136–145)
WBC # BLD: 7.9 K/UL (ref 4–11)

## 2021-10-12 PROCEDURE — 3600000009 HC SURGERY ROBOT BASE: Performed by: UROLOGY

## 2021-10-12 PROCEDURE — 7100000000 HC PACU RECOVERY - FIRST 15 MIN: Performed by: UROLOGY

## 2021-10-12 PROCEDURE — 2580000003 HC RX 258: Performed by: ANESTHESIOLOGY

## 2021-10-12 PROCEDURE — 6370000000 HC RX 637 (ALT 250 FOR IP): Performed by: UROLOGY

## 2021-10-12 PROCEDURE — 7100000001 HC PACU RECOVERY - ADDTL 15 MIN: Performed by: UROLOGY

## 2021-10-12 PROCEDURE — 94761 N-INVAS EAR/PLS OXIMETRY MLT: CPT

## 2021-10-12 PROCEDURE — 86900 BLOOD TYPING SEROLOGIC ABO: CPT

## 2021-10-12 PROCEDURE — 85027 COMPLETE CBC AUTOMATED: CPT

## 2021-10-12 PROCEDURE — 6360000002 HC RX W HCPCS: Performed by: UROLOGY

## 2021-10-12 PROCEDURE — 86850 RBC ANTIBODY SCREEN: CPT

## 2021-10-12 PROCEDURE — 83036 HEMOGLOBIN GLYCOSYLATED A1C: CPT

## 2021-10-12 PROCEDURE — 6360000002 HC RX W HCPCS: Performed by: NURSE ANESTHETIST, CERTIFIED REGISTERED

## 2021-10-12 PROCEDURE — 8E0W4CZ ROBOTIC ASSISTED PROCEDURE OF TRUNK REGION, PERCUTANEOUS ENDOSCOPIC APPROACH: ICD-10-PCS | Performed by: UROLOGY

## 2021-10-12 PROCEDURE — 2500000003 HC RX 250 WO HCPCS: Performed by: ANESTHESIOLOGY

## 2021-10-12 PROCEDURE — 0TB04ZZ EXCISION OF RIGHT KIDNEY, PERCUTANEOUS ENDOSCOPIC APPROACH: ICD-10-PCS | Performed by: UROLOGY

## 2021-10-12 PROCEDURE — 1200000000 HC SEMI PRIVATE

## 2021-10-12 PROCEDURE — 2709999900 HC NON-CHARGEABLE SUPPLY: Performed by: UROLOGY

## 2021-10-12 PROCEDURE — 80048 BASIC METABOLIC PNL TOTAL CA: CPT

## 2021-10-12 PROCEDURE — 88307 TISSUE EXAM BY PATHOLOGIST: CPT

## 2021-10-12 PROCEDURE — S2900 ROBOTIC SURGICAL SYSTEM: HCPCS | Performed by: UROLOGY

## 2021-10-12 PROCEDURE — 6360000002 HC RX W HCPCS: Performed by: ANESTHESIOLOGY

## 2021-10-12 PROCEDURE — 3600000019 HC SURGERY ROBOT ADDTL 15MIN: Performed by: UROLOGY

## 2021-10-12 PROCEDURE — 88341 IMHCHEM/IMCYTCHM EA ADD ANTB: CPT

## 2021-10-12 PROCEDURE — 2500000003 HC RX 250 WO HCPCS: Performed by: NURSE ANESTHETIST, CERTIFIED REGISTERED

## 2021-10-12 PROCEDURE — 3700000000 HC ANESTHESIA ATTENDED CARE: Performed by: UROLOGY

## 2021-10-12 PROCEDURE — 2700000000 HC OXYGEN THERAPY PER DAY

## 2021-10-12 PROCEDURE — 3700000001 HC ADD 15 MINUTES (ANESTHESIA): Performed by: UROLOGY

## 2021-10-12 PROCEDURE — 2500000003 HC RX 250 WO HCPCS: Performed by: UROLOGY

## 2021-10-12 PROCEDURE — 86901 BLOOD TYPING SEROLOGIC RH(D): CPT

## 2021-10-12 PROCEDURE — 88342 IMHCHEM/IMCYTCHM 1ST ANTB: CPT

## 2021-10-12 PROCEDURE — 2580000003 HC RX 258: Performed by: NURSE ANESTHETIST, CERTIFIED REGISTERED

## 2021-10-12 PROCEDURE — 2580000003 HC RX 258: Performed by: UROLOGY

## 2021-10-12 RX ORDER — ROCURONIUM BROMIDE 10 MG/ML
INJECTION, SOLUTION INTRAVENOUS PRN
Status: DISCONTINUED | OUTPATIENT
Start: 2021-10-12 | End: 2021-10-12 | Stop reason: SDUPTHER

## 2021-10-12 RX ORDER — OXYCODONE HYDROCHLORIDE 5 MG/1
10 TABLET ORAL EVERY 4 HOURS PRN
Status: DISCONTINUED | OUTPATIENT
Start: 2021-10-12 | End: 2021-10-14 | Stop reason: HOSPADM

## 2021-10-12 RX ORDER — ONDANSETRON 2 MG/ML
4 INJECTION INTRAMUSCULAR; INTRAVENOUS PRN
Status: DISCONTINUED | OUTPATIENT
Start: 2021-10-12 | End: 2021-10-12 | Stop reason: HOSPADM

## 2021-10-12 RX ORDER — MAGNESIUM SULFATE 1 G/100ML
1000 INJECTION INTRAVENOUS PRN
Status: DISCONTINUED | OUTPATIENT
Start: 2021-10-12 | End: 2021-10-14 | Stop reason: HOSPADM

## 2021-10-12 RX ORDER — POTASSIUM CHLORIDE 20 MEQ/1
40 TABLET, EXTENDED RELEASE ORAL PRN
Status: DISCONTINUED | OUTPATIENT
Start: 2021-10-12 | End: 2021-10-14 | Stop reason: HOSPADM

## 2021-10-12 RX ORDER — MORPHINE SULFATE 2 MG/ML
4 INJECTION, SOLUTION INTRAMUSCULAR; INTRAVENOUS
Status: DISCONTINUED | OUTPATIENT
Start: 2021-10-12 | End: 2021-10-14 | Stop reason: HOSPADM

## 2021-10-12 RX ORDER — DEXAMETHASONE SODIUM PHOSPHATE 4 MG/ML
INJECTION, SOLUTION INTRA-ARTICULAR; INTRALESIONAL; INTRAMUSCULAR; INTRAVENOUS; SOFT TISSUE PRN
Status: DISCONTINUED | OUTPATIENT
Start: 2021-10-12 | End: 2021-10-12 | Stop reason: SDUPTHER

## 2021-10-12 RX ORDER — DULOXETIN HYDROCHLORIDE 30 MG/1
30 CAPSULE, DELAYED RELEASE ORAL NIGHTLY
Status: DISCONTINUED | OUTPATIENT
Start: 2021-10-12 | End: 2021-10-14 | Stop reason: HOSPADM

## 2021-10-12 RX ORDER — OXYCODONE HYDROCHLORIDE AND ACETAMINOPHEN 5; 325 MG/1; MG/1
1 TABLET ORAL PRN
Status: DISCONTINUED | OUTPATIENT
Start: 2021-10-12 | End: 2021-10-12 | Stop reason: HOSPADM

## 2021-10-12 RX ORDER — CETIRIZINE HYDROCHLORIDE 10 MG/1
10 TABLET ORAL DAILY
Status: DISCONTINUED | OUTPATIENT
Start: 2021-10-12 | End: 2021-10-14 | Stop reason: HOSPADM

## 2021-10-12 RX ORDER — DIPHENHYDRAMINE HYDROCHLORIDE 50 MG/ML
12.5 INJECTION INTRAMUSCULAR; INTRAVENOUS
Status: DISCONTINUED | OUTPATIENT
Start: 2021-10-12 | End: 2021-10-12 | Stop reason: HOSPADM

## 2021-10-12 RX ORDER — ACETAMINOPHEN 325 MG/1
650 TABLET ORAL EVERY 4 HOURS PRN
Status: DISCONTINUED | OUTPATIENT
Start: 2021-10-12 | End: 2021-10-14 | Stop reason: HOSPADM

## 2021-10-12 RX ORDER — MORPHINE SULFATE 2 MG/ML
1 INJECTION, SOLUTION INTRAMUSCULAR; INTRAVENOUS EVERY 5 MIN PRN
Status: DISCONTINUED | OUTPATIENT
Start: 2021-10-12 | End: 2021-10-12 | Stop reason: HOSPADM

## 2021-10-12 RX ORDER — ONDANSETRON 2 MG/ML
4 INJECTION INTRAMUSCULAR; INTRAVENOUS EVERY 6 HOURS PRN
Status: DISCONTINUED | OUTPATIENT
Start: 2021-10-12 | End: 2021-10-14 | Stop reason: HOSPADM

## 2021-10-12 RX ORDER — MORPHINE SULFATE 2 MG/ML
2 INJECTION, SOLUTION INTRAMUSCULAR; INTRAVENOUS EVERY 5 MIN PRN
Status: DISCONTINUED | OUTPATIENT
Start: 2021-10-12 | End: 2021-10-12 | Stop reason: HOSPADM

## 2021-10-12 RX ORDER — PROPOFOL 10 MG/ML
INJECTION, EMULSION INTRAVENOUS PRN
Status: DISCONTINUED | OUTPATIENT
Start: 2021-10-12 | End: 2021-10-12 | Stop reason: SDUPTHER

## 2021-10-12 RX ORDER — LABETALOL HYDROCHLORIDE 5 MG/ML
5 INJECTION, SOLUTION INTRAVENOUS EVERY 10 MIN PRN
Status: DISCONTINUED | OUTPATIENT
Start: 2021-10-12 | End: 2021-10-12 | Stop reason: HOSPADM

## 2021-10-12 RX ORDER — DEXTROSE MONOHYDRATE 50 MG/ML
100 INJECTION, SOLUTION INTRAVENOUS PRN
Status: DISCONTINUED | OUTPATIENT
Start: 2021-10-12 | End: 2021-10-14 | Stop reason: HOSPADM

## 2021-10-12 RX ORDER — LIDOCAINE HYDROCHLORIDE 20 MG/ML
INJECTION, SOLUTION INFILTRATION; PERINEURAL PRN
Status: DISCONTINUED | OUTPATIENT
Start: 2021-10-12 | End: 2021-10-12 | Stop reason: SDUPTHER

## 2021-10-12 RX ORDER — ATORVASTATIN CALCIUM 40 MG/1
40 TABLET, FILM COATED ORAL NIGHTLY
Status: DISCONTINUED | OUTPATIENT
Start: 2021-10-12 | End: 2021-10-14 | Stop reason: HOSPADM

## 2021-10-12 RX ORDER — POTASSIUM CHLORIDE 7.45 MG/ML
10 INJECTION INTRAVENOUS PRN
Status: DISCONTINUED | OUTPATIENT
Start: 2021-10-12 | End: 2021-10-14 | Stop reason: HOSPADM

## 2021-10-12 RX ORDER — LIDOCAINE 4 G/G
1 PATCH TOPICAL EVERY 12 HOURS
Status: DISCONTINUED | OUTPATIENT
Start: 2021-10-12 | End: 2021-10-14 | Stop reason: HOSPADM

## 2021-10-12 RX ORDER — NICOTINE POLACRILEX 4 MG
15 LOZENGE BUCCAL PRN
Status: DISCONTINUED | OUTPATIENT
Start: 2021-10-12 | End: 2021-10-14 | Stop reason: HOSPADM

## 2021-10-12 RX ORDER — SODIUM CHLORIDE 0.9 % (FLUSH) 0.9 %
10 SYRINGE (ML) INJECTION EVERY 12 HOURS SCHEDULED
Status: DISCONTINUED | OUTPATIENT
Start: 2021-10-12 | End: 2021-10-12 | Stop reason: HOSPADM

## 2021-10-12 RX ORDER — ASPIRIN 81 MG/1
81 TABLET ORAL NIGHTLY
Status: DISCONTINUED | OUTPATIENT
Start: 2021-10-13 | End: 2021-10-14 | Stop reason: HOSPADM

## 2021-10-12 RX ORDER — SODIUM CHLORIDE 0.9 % (FLUSH) 0.9 %
5-40 SYRINGE (ML) INJECTION PRN
Status: DISCONTINUED | OUTPATIENT
Start: 2021-10-12 | End: 2021-10-14 | Stop reason: HOSPADM

## 2021-10-12 RX ORDER — POLYETHYLENE GLYCOL 3350 17 G/17G
17 POWDER, FOR SOLUTION ORAL DAILY PRN
Status: DISCONTINUED | OUTPATIENT
Start: 2021-10-12 | End: 2021-10-14 | Stop reason: HOSPADM

## 2021-10-12 RX ORDER — OXYCODONE HYDROCHLORIDE 5 MG/1
5 TABLET ORAL EVERY 4 HOURS PRN
Status: DISCONTINUED | OUTPATIENT
Start: 2021-10-12 | End: 2021-10-14 | Stop reason: HOSPADM

## 2021-10-12 RX ORDER — HYDRALAZINE HYDROCHLORIDE 20 MG/ML
5 INJECTION INTRAMUSCULAR; INTRAVENOUS EVERY 10 MIN PRN
Status: DISCONTINUED | OUTPATIENT
Start: 2021-10-12 | End: 2021-10-12 | Stop reason: HOSPADM

## 2021-10-12 RX ORDER — SODIUM CHLORIDE 9 MG/ML
25 INJECTION, SOLUTION INTRAVENOUS PRN
Status: DISCONTINUED | OUTPATIENT
Start: 2021-10-12 | End: 2021-10-14 | Stop reason: HOSPADM

## 2021-10-12 RX ORDER — DOCUSATE SODIUM 100 MG/1
100 CAPSULE, LIQUID FILLED ORAL NIGHTLY
Status: DISCONTINUED | OUTPATIENT
Start: 2021-10-12 | End: 2021-10-14 | Stop reason: HOSPADM

## 2021-10-12 RX ORDER — MEPERIDINE HYDROCHLORIDE 50 MG/ML
12.5 INJECTION INTRAMUSCULAR; INTRAVENOUS; SUBCUTANEOUS EVERY 5 MIN PRN
Status: DISCONTINUED | OUTPATIENT
Start: 2021-10-12 | End: 2021-10-12 | Stop reason: HOSPADM

## 2021-10-12 RX ORDER — SODIUM CHLORIDE 9 MG/ML
25 INJECTION, SOLUTION INTRAVENOUS PRN
Status: DISCONTINUED | OUTPATIENT
Start: 2021-10-12 | End: 2021-10-12 | Stop reason: HOSPADM

## 2021-10-12 RX ORDER — DEXTROSE MONOHYDRATE 25 G/50ML
12.5 INJECTION, SOLUTION INTRAVENOUS PRN
Status: DISCONTINUED | OUTPATIENT
Start: 2021-10-12 | End: 2021-10-14 | Stop reason: HOSPADM

## 2021-10-12 RX ORDER — MANNITOL 20 G/100ML
INJECTION, SOLUTION INTRAVENOUS PRN
Status: DISCONTINUED | OUTPATIENT
Start: 2021-10-12 | End: 2021-10-12 | Stop reason: SDUPTHER

## 2021-10-12 RX ORDER — OXYCODONE HYDROCHLORIDE AND ACETAMINOPHEN 5; 325 MG/1; MG/1
2 TABLET ORAL PRN
Status: DISCONTINUED | OUTPATIENT
Start: 2021-10-12 | End: 2021-10-12 | Stop reason: HOSPADM

## 2021-10-12 RX ORDER — ONDANSETRON 2 MG/ML
INJECTION INTRAMUSCULAR; INTRAVENOUS PRN
Status: DISCONTINUED | OUTPATIENT
Start: 2021-10-12 | End: 2021-10-12 | Stop reason: SDUPTHER

## 2021-10-12 RX ORDER — PROMETHAZINE HYDROCHLORIDE 25 MG/ML
6.25 INJECTION, SOLUTION INTRAMUSCULAR; INTRAVENOUS
Status: DISCONTINUED | OUTPATIENT
Start: 2021-10-12 | End: 2021-10-12 | Stop reason: HOSPADM

## 2021-10-12 RX ORDER — SODIUM CHLORIDE, SODIUM LACTATE, POTASSIUM CHLORIDE, CALCIUM CHLORIDE 600; 310; 30; 20 MG/100ML; MG/100ML; MG/100ML; MG/100ML
INJECTION, SOLUTION INTRAVENOUS CONTINUOUS
Status: DISCONTINUED | OUTPATIENT
Start: 2021-10-12 | End: 2021-10-12

## 2021-10-12 RX ORDER — MORPHINE SULFATE 2 MG/ML
2 INJECTION, SOLUTION INTRAMUSCULAR; INTRAVENOUS
Status: DISCONTINUED | OUTPATIENT
Start: 2021-10-12 | End: 2021-10-14 | Stop reason: HOSPADM

## 2021-10-12 RX ORDER — LEVOTHYROXINE SODIUM 0.03 MG/1
25 TABLET ORAL DAILY
Status: DISCONTINUED | OUTPATIENT
Start: 2021-10-12 | End: 2021-10-14 | Stop reason: HOSPADM

## 2021-10-12 RX ORDER — LABETALOL HYDROCHLORIDE 5 MG/ML
INJECTION, SOLUTION INTRAVENOUS PRN
Status: DISCONTINUED | OUTPATIENT
Start: 2021-10-12 | End: 2021-10-12 | Stop reason: SDUPTHER

## 2021-10-12 RX ORDER — PROMETHAZINE HYDROCHLORIDE 25 MG/1
12.5 TABLET ORAL EVERY 6 HOURS PRN
Status: DISCONTINUED | OUTPATIENT
Start: 2021-10-12 | End: 2021-10-14 | Stop reason: HOSPADM

## 2021-10-12 RX ORDER — FENTANYL CITRATE 50 UG/ML
INJECTION, SOLUTION INTRAMUSCULAR; INTRAVENOUS PRN
Status: DISCONTINUED | OUTPATIENT
Start: 2021-10-12 | End: 2021-10-12 | Stop reason: SDUPTHER

## 2021-10-12 RX ORDER — SODIUM CHLORIDE 9 MG/ML
INJECTION, SOLUTION INTRAVENOUS CONTINUOUS PRN
Status: DISCONTINUED | OUTPATIENT
Start: 2021-10-12 | End: 2021-10-12 | Stop reason: SDUPTHER

## 2021-10-12 RX ORDER — BUPIVACAINE HYDROCHLORIDE 5 MG/ML
INJECTION, SOLUTION EPIDURAL; INTRACAUDAL PRN
Status: DISCONTINUED | OUTPATIENT
Start: 2021-10-12 | End: 2021-10-12 | Stop reason: ALTCHOICE

## 2021-10-12 RX ORDER — MIDAZOLAM HYDROCHLORIDE 1 MG/ML
INJECTION INTRAMUSCULAR; INTRAVENOUS PRN
Status: DISCONTINUED | OUTPATIENT
Start: 2021-10-12 | End: 2021-10-12 | Stop reason: SDUPTHER

## 2021-10-12 RX ORDER — SODIUM CHLORIDE 0.9 % (FLUSH) 0.9 %
10 SYRINGE (ML) INJECTION PRN
Status: DISCONTINUED | OUTPATIENT
Start: 2021-10-12 | End: 2021-10-12 | Stop reason: HOSPADM

## 2021-10-12 RX ORDER — SODIUM CHLORIDE 9 MG/ML
INJECTION, SOLUTION INTRAVENOUS CONTINUOUS
Status: DISCONTINUED | OUTPATIENT
Start: 2021-10-12 | End: 2021-10-14 | Stop reason: HOSPADM

## 2021-10-12 RX ORDER — SODIUM CHLORIDE 0.9 % (FLUSH) 0.9 %
5-40 SYRINGE (ML) INJECTION EVERY 12 HOURS SCHEDULED
Status: DISCONTINUED | OUTPATIENT
Start: 2021-10-12 | End: 2021-10-14 | Stop reason: HOSPADM

## 2021-10-12 RX ADMIN — DULOXETINE HYDROCHLORIDE 30 MG: 30 CAPSULE, DELAYED RELEASE ORAL at 20:21

## 2021-10-12 RX ADMIN — ROCURONIUM BROMIDE 50 MG: 10 SOLUTION INTRAVENOUS at 12:27

## 2021-10-12 RX ADMIN — FENTANYL CITRATE 100 MCG: 50 INJECTION, SOLUTION INTRAMUSCULAR; INTRAVENOUS at 13:23

## 2021-10-12 RX ADMIN — HYDROMORPHONE HYDROCHLORIDE 0.5 MG: 1 INJECTION, SOLUTION INTRAMUSCULAR; INTRAVENOUS; SUBCUTANEOUS at 15:52

## 2021-10-12 RX ADMIN — SUGAMMADEX 200 MG: 100 INJECTION, SOLUTION INTRAVENOUS at 15:09

## 2021-10-12 RX ADMIN — ATORVASTATIN CALCIUM 40 MG: 40 TABLET, FILM COATED ORAL at 20:21

## 2021-10-12 RX ADMIN — LABETALOL HYDROCHLORIDE 5 MG: 5 INJECTION, SOLUTION INTRAVENOUS at 13:26

## 2021-10-12 RX ADMIN — DOCUSATE SODIUM 100 MG: 100 CAPSULE ORAL at 20:21

## 2021-10-12 RX ADMIN — HYDROMORPHONE HYDROCHLORIDE 0.5 MG: 1 INJECTION, SOLUTION INTRAMUSCULAR; INTRAVENOUS; SUBCUTANEOUS at 16:02

## 2021-10-12 RX ADMIN — LABETALOL HYDROCHLORIDE 5 MG: 5 INJECTION, SOLUTION INTRAVENOUS at 15:18

## 2021-10-12 RX ADMIN — DEXAMETHASONE SODIUM PHOSPHATE 8 MG: 4 INJECTION, SOLUTION INTRAMUSCULAR; INTRAVENOUS at 13:05

## 2021-10-12 RX ADMIN — CEFAZOLIN 2000 MG: 10 INJECTION, POWDER, FOR SOLUTION INTRAVENOUS at 12:31

## 2021-10-12 RX ADMIN — LABETALOL HYDROCHLORIDE 5 MG: 5 INJECTION, SOLUTION INTRAVENOUS at 15:24

## 2021-10-12 RX ADMIN — SODIUM CHLORIDE: 9 INJECTION, SOLUTION INTRAVENOUS at 12:24

## 2021-10-12 RX ADMIN — ONDANSETRON 4 MG: 2 INJECTION INTRAMUSCULAR; INTRAVENOUS at 13:05

## 2021-10-12 RX ADMIN — INSULIN LISPRO 2 UNITS: 100 INJECTION, SOLUTION INTRAVENOUS; SUBCUTANEOUS at 19:21

## 2021-10-12 RX ADMIN — MIDAZOLAM HYDROCHLORIDE 2 MG: 2 INJECTION, SOLUTION INTRAMUSCULAR; INTRAVENOUS at 12:21

## 2021-10-12 RX ADMIN — SODIUM CHLORIDE: 9 INJECTION, SOLUTION INTRAVENOUS at 13:37

## 2021-10-12 RX ADMIN — ROCURONIUM BROMIDE 30 MG: 10 SOLUTION INTRAVENOUS at 13:05

## 2021-10-12 RX ADMIN — OXYCODONE HYDROCHLORIDE 10 MG: 5 TABLET ORAL at 23:26

## 2021-10-12 RX ADMIN — CETIRIZINE HYDROCHLORIDE 10 MG: 10 TABLET, FILM COATED ORAL at 19:10

## 2021-10-12 RX ADMIN — MANNITOL 62.5 ML: 20 INJECTION, SOLUTION INTRAVENOUS at 14:24

## 2021-10-12 RX ADMIN — SODIUM CHLORIDE, POTASSIUM CHLORIDE, SODIUM LACTATE AND CALCIUM CHLORIDE: 600; 310; 30; 20 INJECTION, SOLUTION INTRAVENOUS at 11:05

## 2021-10-12 RX ADMIN — FENTANYL CITRATE 100 MCG: 50 INJECTION, SOLUTION INTRAMUSCULAR; INTRAVENOUS at 12:27

## 2021-10-12 RX ADMIN — SODIUM CHLORIDE, POTASSIUM CHLORIDE, SODIUM LACTATE AND CALCIUM CHLORIDE: 600; 310; 30; 20 INJECTION, SOLUTION INTRAVENOUS at 14:14

## 2021-10-12 RX ADMIN — MANNITOL 62.5 ML: 20 INJECTION, SOLUTION INTRAVENOUS at 14:43

## 2021-10-12 RX ADMIN — FENTANYL CITRATE 50 MCG: 50 INJECTION, SOLUTION INTRAMUSCULAR; INTRAVENOUS at 13:07

## 2021-10-12 RX ADMIN — PROPOFOL 140 MG: 10 INJECTION, EMULSION INTRAVENOUS at 12:27

## 2021-10-12 RX ADMIN — SODIUM CHLORIDE: 9 INJECTION, SOLUTION INTRAVENOUS at 19:03

## 2021-10-12 RX ADMIN — LIDOCAINE HYDROCHLORIDE 60 MG: 20 INJECTION, SOLUTION INFILTRATION; PERINEURAL at 12:27

## 2021-10-12 RX ADMIN — ROCURONIUM BROMIDE 20 MG: 10 SOLUTION INTRAVENOUS at 14:13

## 2021-10-12 RX ADMIN — FAMOTIDINE 20 MG: 10 INJECTION, SOLUTION INTRAVENOUS at 11:05

## 2021-10-12 ASSESSMENT — PULMONARY FUNCTION TESTS
PIF_VALUE: 26
PIF_VALUE: 25
PIF_VALUE: 20
PIF_VALUE: 26
PIF_VALUE: 5
PIF_VALUE: 1
PIF_VALUE: 25
PIF_VALUE: 26
PIF_VALUE: 3
PIF_VALUE: 15
PIF_VALUE: 20
PIF_VALUE: 6
PIF_VALUE: 5
PIF_VALUE: 2
PIF_VALUE: 33
PIF_VALUE: 25
PIF_VALUE: 27
PIF_VALUE: 25
PIF_VALUE: 24
PIF_VALUE: 32
PIF_VALUE: 20
PIF_VALUE: 3
PIF_VALUE: 25
PIF_VALUE: 27
PIF_VALUE: 31
PIF_VALUE: 3
PIF_VALUE: 8
PIF_VALUE: 31
PIF_VALUE: 26
PIF_VALUE: 32
PIF_VALUE: 31
PIF_VALUE: 19
PIF_VALUE: 32
PIF_VALUE: 32
PIF_VALUE: 15
PIF_VALUE: 15
PIF_VALUE: 2
PIF_VALUE: 28
PIF_VALUE: 25
PIF_VALUE: 25
PIF_VALUE: 31
PIF_VALUE: 25
PIF_VALUE: 5
PIF_VALUE: 24
PIF_VALUE: 20
PIF_VALUE: 25
PIF_VALUE: 2
PIF_VALUE: 25
PIF_VALUE: 25
PIF_VALUE: 32
PIF_VALUE: 23
PIF_VALUE: 25
PIF_VALUE: 27
PIF_VALUE: 27
PIF_VALUE: 19
PIF_VALUE: 25
PIF_VALUE: 24
PIF_VALUE: 19
PIF_VALUE: 19
PIF_VALUE: 1
PIF_VALUE: 3
PIF_VALUE: 19
PIF_VALUE: 22
PIF_VALUE: 23
PIF_VALUE: 3
PIF_VALUE: 30
PIF_VALUE: 28
PIF_VALUE: 1
PIF_VALUE: 14
PIF_VALUE: 17
PIF_VALUE: 24
PIF_VALUE: 14
PIF_VALUE: 32
PIF_VALUE: 31
PIF_VALUE: 24
PIF_VALUE: 32
PIF_VALUE: 27
PIF_VALUE: 2
PIF_VALUE: 24
PIF_VALUE: 14
PIF_VALUE: 16
PIF_VALUE: 27
PIF_VALUE: 14
PIF_VALUE: 2
PIF_VALUE: 25
PIF_VALUE: 24
PIF_VALUE: 32
PIF_VALUE: 26
PIF_VALUE: 14
PIF_VALUE: 24
PIF_VALUE: 27
PIF_VALUE: 31
PIF_VALUE: 27
PIF_VALUE: 2
PIF_VALUE: 15
PIF_VALUE: 2
PIF_VALUE: 2
PIF_VALUE: 27
PIF_VALUE: 25
PIF_VALUE: 3
PIF_VALUE: 14
PIF_VALUE: 24
PIF_VALUE: 27
PIF_VALUE: 2
PIF_VALUE: 25
PIF_VALUE: 17
PIF_VALUE: 20
PIF_VALUE: 3
PIF_VALUE: 26
PIF_VALUE: 27
PIF_VALUE: 1
PIF_VALUE: 15
PIF_VALUE: 33
PIF_VALUE: 1
PIF_VALUE: 25
PIF_VALUE: 27
PIF_VALUE: 26
PIF_VALUE: 25
PIF_VALUE: 26
PIF_VALUE: 25
PIF_VALUE: 25
PIF_VALUE: 2
PIF_VALUE: 15
PIF_VALUE: 25
PIF_VALUE: 26
PIF_VALUE: 31
PIF_VALUE: 18
PIF_VALUE: 19
PIF_VALUE: 27
PIF_VALUE: 25
PIF_VALUE: 19
PIF_VALUE: 14
PIF_VALUE: 28
PIF_VALUE: 2
PIF_VALUE: 2
PIF_VALUE: 31
PIF_VALUE: 15
PIF_VALUE: 17
PIF_VALUE: 18
PIF_VALUE: 26
PIF_VALUE: 25
PIF_VALUE: 20
PIF_VALUE: 27
PIF_VALUE: 18
PIF_VALUE: 14
PIF_VALUE: 24
PIF_VALUE: 26
PIF_VALUE: 25
PIF_VALUE: 28
PIF_VALUE: 32
PIF_VALUE: 3
PIF_VALUE: 19
PIF_VALUE: 31
PIF_VALUE: 20
PIF_VALUE: 1
PIF_VALUE: 26
PIF_VALUE: 26
PIF_VALUE: 25
PIF_VALUE: 28
PIF_VALUE: 27
PIF_VALUE: 27
PIF_VALUE: 26
PIF_VALUE: 25
PIF_VALUE: 26
PIF_VALUE: 30
PIF_VALUE: 29
PIF_VALUE: 25
PIF_VALUE: 24
PIF_VALUE: 19
PIF_VALUE: 25
PIF_VALUE: 24
PIF_VALUE: 26
PIF_VALUE: 25
PIF_VALUE: 15
PIF_VALUE: 2
PIF_VALUE: 25
PIF_VALUE: 27
PIF_VALUE: 24
PIF_VALUE: 26
PIF_VALUE: 32
PIF_VALUE: 1
PIF_VALUE: 27
PIF_VALUE: 15
PIF_VALUE: 21
PIF_VALUE: 26
PIF_VALUE: 1
PIF_VALUE: 15
PIF_VALUE: 19
PIF_VALUE: 26
PIF_VALUE: 3
PIF_VALUE: 1
PIF_VALUE: 26
PIF_VALUE: 27
PIF_VALUE: 2
PIF_VALUE: 31

## 2021-10-12 ASSESSMENT — PAIN DESCRIPTION - FREQUENCY
FREQUENCY: CONTINUOUS
FREQUENCY: CONTINUOUS

## 2021-10-12 ASSESSMENT — PAIN DESCRIPTION - ONSET
ONSET: ON-GOING
ONSET: ON-GOING

## 2021-10-12 ASSESSMENT — PAIN DESCRIPTION - PAIN TYPE
TYPE: ACUTE PAIN;SURGICAL PAIN
TYPE: ACUTE PAIN;SURGICAL PAIN

## 2021-10-12 ASSESSMENT — PAIN SCALES - GENERAL
PAINLEVEL_OUTOF10: 8
PAINLEVEL_OUTOF10: 7
PAINLEVEL_OUTOF10: 8
PAINLEVEL_OUTOF10: 5
PAINLEVEL_OUTOF10: 8
PAINLEVEL_OUTOF10: 4
PAINLEVEL_OUTOF10: 7
PAINLEVEL_OUTOF10: 7

## 2021-10-12 ASSESSMENT — PAIN DESCRIPTION - ORIENTATION
ORIENTATION: RIGHT
ORIENTATION: RIGHT

## 2021-10-12 ASSESSMENT — PAIN DESCRIPTION - LOCATION
LOCATION: ABDOMEN
LOCATION: ABDOMEN

## 2021-10-12 ASSESSMENT — PAIN DESCRIPTION - PROGRESSION
CLINICAL_PROGRESSION: GRADUALLY IMPROVING
CLINICAL_PROGRESSION: NOT CHANGED

## 2021-10-12 ASSESSMENT — PAIN - FUNCTIONAL ASSESSMENT
PAIN_FUNCTIONAL_ASSESSMENT: ACTIVITIES ARE NOT PREVENTED
PAIN_FUNCTIONAL_ASSESSMENT: 0-10

## 2021-10-12 ASSESSMENT — PAIN DESCRIPTION - DESCRIPTORS
DESCRIPTORS: ACHING
DESCRIPTORS: ACHING;SHARP

## 2021-10-12 NOTE — PROGRESS NOTES
Patient bedside report given to floor nurse and four eye assessment completed. Patient pain level tolerable at 5/10. Patient able to eat ice chips without nausea. 4 Eyes Skin Assessment     The patient is being assess for   Post-Op Surgical    I agree that 2 RN's have performed a thorough Head to Toe Skin Assessment on the patient. ALL assessment sites listed below have been assessed. Areas assessed for pressure by both nurses:   [x]   Head, Face, and Ears   [x]   Shoulders, Back, and Chest, Abdomen  [x]   Arms, Elbows, and Hands   [x]   Coccyx, Sacrum, and Ischium  [x]   Legs, Feet, and Heels        Skin Assessed Under all Medical Devices by both nurses:  ravi, SCD's and SHANI drain              All Mepilex Borders were peeled back and area peeked at by both nurses:  Yes  Please list where Mepilex Borders are located:no mepelex on bottom, bottom free of redness or deep pressure injury             **SHARE this note so that the co-signing nurse is able to place an eSignature**    Co-signer eSignature: Electronically signed by Lelia Ko RN on 10/12/21 at 8:16 PM EDT    Does the Patient have Skin Breakdown related to pressure?   No              Ino Prevention initiated:  NA   Wound Care Orders initiated:  NA      WOC nurse consulted for Pressure Injury (Stage 3,4, Unstageable, DTI, NWPT, Complex wounds)and New or Established Ostomies:  NA      Primary Nurse eSignature: {Esignature:[id number]}

## 2021-10-12 NOTE — BRIEF OP NOTE
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    Post-Op Diagnosis: Same       Procedure(s):  DAVINCI RIGHT PARTIAL NEPHRECTOMY WITH ULTRASONIC PROBE    Surgeon(s):  Sharmila Rainey MD    Assistant:  Surgical Assistant: Awilda Sanchez    Anesthesia: General    Estimated Blood Loss (mL): less than 331     Complications: None    Specimens:   ID Type Source Tests Collected by Time Destination   A : RIGHT KIDNEY MASS Tissue Tissue SURGICAL PATHOLOGY Sharmila Rainey MD 10/12/2021 1506        Implants:  * No implants in log *      Drains:   Closed/Suction Drain Lateral;Right RLQ Bulb 15 Burmese (Active)       Urethral Catheter Latex 16 fr (Active)       Findings: right upper pole renal mass    Plan- SHANI Cr level in AM    Electronically signed by Sharmila Rainey MD on 10/12/2021 at 3:24 PM

## 2021-10-13 LAB
ANION GAP SERPL CALCULATED.3IONS-SCNC: 10 MMOL/L (ref 3–16)
BUN BLDV-MCNC: 15 MG/DL (ref 7–20)
CALCIUM SERPL-MCNC: 8.9 MG/DL (ref 8.3–10.6)
CHLORIDE BLD-SCNC: 107 MMOL/L (ref 99–110)
CO2: 23 MMOL/L (ref 21–32)
CREAT SERPL-MCNC: 0.8 MG/DL (ref 0.6–1.2)
CREATININE FLUID: 0.7 MG/DL
ESTIMATED AVERAGE GLUCOSE: 148.5 MG/DL
FLUID TYPE: NORMAL
GFR AFRICAN AMERICAN: >60
GFR NON-AFRICAN AMERICAN: >60
GLUCOSE BLD-MCNC: 111 MG/DL (ref 70–99)
GLUCOSE BLD-MCNC: 115 MG/DL (ref 70–99)
GLUCOSE BLD-MCNC: 132 MG/DL (ref 70–99)
GLUCOSE BLD-MCNC: 182 MG/DL (ref 70–99)
GLUCOSE BLD-MCNC: 196 MG/DL (ref 70–99)
HBA1C MFR BLD: 6.8 %
HCT VFR BLD CALC: 39.1 % (ref 36–48)
HEMOGLOBIN: 13.3 G/DL (ref 12–16)
MCH RBC QN AUTO: 31.4 PG (ref 26–34)
MCHC RBC AUTO-ENTMCNC: 34 G/DL (ref 31–36)
MCV RBC AUTO: 92.4 FL (ref 80–100)
PDW BLD-RTO: 13.4 % (ref 12.4–15.4)
PERFORMED ON: ABNORMAL
PLATELET # BLD: 187 K/UL (ref 135–450)
PMV BLD AUTO: 8.2 FL (ref 5–10.5)
POTASSIUM REFLEX MAGNESIUM: 4.8 MMOL/L (ref 3.5–5.1)
RBC # BLD: 4.23 M/UL (ref 4–5.2)
SODIUM BLD-SCNC: 140 MMOL/L (ref 136–145)
WBC # BLD: 12.5 K/UL (ref 4–11)

## 2021-10-13 PROCEDURE — 36415 COLL VENOUS BLD VENIPUNCTURE: CPT

## 2021-10-13 PROCEDURE — 2580000003 HC RX 258: Performed by: UROLOGY

## 2021-10-13 PROCEDURE — 82570 ASSAY OF URINE CREATININE: CPT

## 2021-10-13 PROCEDURE — 80048 BASIC METABOLIC PNL TOTAL CA: CPT

## 2021-10-13 PROCEDURE — 85027 COMPLETE CBC AUTOMATED: CPT

## 2021-10-13 PROCEDURE — 6370000000 HC RX 637 (ALT 250 FOR IP): Performed by: UROLOGY

## 2021-10-13 PROCEDURE — 1200000000 HC SEMI PRIVATE

## 2021-10-13 PROCEDURE — 6360000002 HC RX W HCPCS: Performed by: UROLOGY

## 2021-10-13 RX ORDER — NICOTINE 21 MG/24HR
1 PATCH, TRANSDERMAL 24 HOURS TRANSDERMAL DAILY
Status: DISCONTINUED | OUTPATIENT
Start: 2021-10-13 | End: 2021-10-14 | Stop reason: HOSPADM

## 2021-10-13 RX ADMIN — INSULIN LISPRO 1 UNITS: 100 INJECTION, SOLUTION INTRAVENOUS; SUBCUTANEOUS at 21:53

## 2021-10-13 RX ADMIN — CETIRIZINE HYDROCHLORIDE 10 MG: 10 TABLET, FILM COATED ORAL at 09:27

## 2021-10-13 RX ADMIN — OXYCODONE 5 MG: 5 TABLET ORAL at 17:45

## 2021-10-13 RX ADMIN — Medication 10 ML: at 21:59

## 2021-10-13 RX ADMIN — OXYCODONE HYDROCHLORIDE 10 MG: 5 TABLET ORAL at 22:21

## 2021-10-13 RX ADMIN — LEVOTHYROXINE SODIUM 25 MCG: 0.03 TABLET ORAL at 05:20

## 2021-10-13 RX ADMIN — ASPIRIN 81 MG: 81 TABLET, COATED ORAL at 21:52

## 2021-10-13 RX ADMIN — MORPHINE SULFATE 4 MG: 2 INJECTION, SOLUTION INTRAMUSCULAR; INTRAVENOUS at 04:24

## 2021-10-13 RX ADMIN — Medication 10 ML: at 09:27

## 2021-10-13 RX ADMIN — ATORVASTATIN CALCIUM 40 MG: 40 TABLET, FILM COATED ORAL at 21:52

## 2021-10-13 RX ADMIN — OXYCODONE 5 MG: 5 TABLET ORAL at 10:49

## 2021-10-13 RX ADMIN — DULOXETINE HYDROCHLORIDE 30 MG: 30 CAPSULE, DELAYED RELEASE ORAL at 21:52

## 2021-10-13 RX ADMIN — ENOXAPARIN SODIUM 40 MG: 40 INJECTION SUBCUTANEOUS at 09:27

## 2021-10-13 RX ADMIN — DOCUSATE SODIUM 100 MG: 100 CAPSULE ORAL at 21:52

## 2021-10-13 ASSESSMENT — PAIN SCALES - GENERAL
PAINLEVEL_OUTOF10: 8
PAINLEVEL_OUTOF10: 0
PAINLEVEL_OUTOF10: 8
PAINLEVEL_OUTOF10: 8
PAINLEVEL_OUTOF10: 0
PAINLEVEL_OUTOF10: 5
PAINLEVEL_OUTOF10: 7

## 2021-10-13 NOTE — ANESTHESIA POSTPROCEDURE EVALUATION
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        Anesthesia Post Evaluation    Comments: Postoperative Anesthesia Note    Name:    Destiny Tolbert  MRN:      [id number]    Patient Vitals in the past 12 hrs:  10/12/21 2020, BP:115/79, Temp:98.5 °F (36.9 °C), Temp src:Oral, Pulse:87, SpO2:90 %  10/12/21 1755, BP:(!) 143/86, Temp:98.4 °F (36.9 °C), Temp src:Oral, Pulse:81, SpO2:92 %  10/12/21 1740, Pulse:84, Resp:28, SpO2:90 %  10/12/21 1735, Pulse:82, Resp:14, SpO2:100 %  10/12/21 1730, BP:(!) 122/58, Pulse:80, Resp:27, SpO2:99 %  10/12/21 1725, BP:(!) 125/59, Pulse:80, Resp:11, SpO2:98 %  10/12/21 1720, BP:(!) 132/54, Pulse:81, Resp:23, SpO2:99 %  10/12/21 1715, Pulse:77, Resp:14, SpO2:97 %  10/12/21 1710, BP:(!) 135/57, Pulse:79, Resp:23, SpO2:99 %  10/12/21 1705, BP:(!) 141/22, Pulse:82, Resp:17, SpO2:97 %  10/12/21 1700, BP:(!) 142/66, Pulse:79, Resp:18, SpO2:98 %  10/12/21 1655, Pulse:75, Resp:18, SpO2:98 %  10/12/21 1654, Pulse:79, Resp:15, SpO2:98 %  10/12/21 1653, Pulse:77, Resp:15, SpO2:98 %  10/12/21 1652, Pulse:78, Resp:17, SpO2:100 %  10/12/21 1651, Pulse:80, Resp:22  10/12/21 1650, BP:(!) 135/54, Pulse:81, Resp:25, SpO2:98 %  10/12/21 1649, Pulse:79, Resp:12, SpO2:99 %  10/12/21 1648, BP:134/60, APTT    Intake & Output:  @24HRIO@    Nausea & Vomiting:  No    Level of Consciousness:  Awake    Pain Assessment:  Adequate analgesia    Anesthesia Complications:  No apparent anesthetic complications    SUMMARY      Vital signs stable  OK to discharge from Stage I post anesthesia care.   Care transferred from Anesthesiology department on discharge from perioperative area

## 2021-10-13 NOTE — OP NOTE
[facility]                 AmandeepCindy ferrer                                OPERATIVE REPORT    PATIENT NAME: Cassi Irby                  :        1960  MED REC NO:   [id number]                          ROOM:       3659  ACCOUNT NO:   [de-identified]                           ADMIT DATE: 10/12/2021  PROVIDER:     Ta Mao. Hazel Odonnell MD    DATE OF PROCEDURE:  10/12/2021    LOCATION:  [place] [address] DIAGNOSIS:  Right renal mass. POSTOPERATIVE DIAGNOSIS:  Right renal mass. OPERATION PERFORMED:  Robotic right partial nephrectomy with whole right  renal ultrasound. SURGEON:  Ta Mao. Hazel Odonnell MD    INDICATION FOR PROCEDURE:  The patient is a 23-year-old female with a  2.7 cm right upper pole renal mass suspicious for renal cell carcinoma. The risks and benefits of a robotic right partial nephrectomy and  indicated procedures were discussed with the patient. She agreed to  proceed. OPERATIVE PROCEDURE:  The patient had preoperative antibiotics, general  anesthesia. Pelayo catheter was placed. She was placed in the flank  position with the right side up. She was appropriately padded and  positioned. Her right flank and abdomen were prepped and draped in the  usual sterile fashion. I used 0.5% Marcaine in all my incisions in the  right upper midline. I used an 11 blade to make an 8-mm transverse  incision. I used the Veress needle to insufflate the abdomen. I looked  in with a 5-mm visual trocar and switched it out for an 8-mm robotic  port. I then placed an 8-mm robotic port 7.5 cm superior to this and  another 8-mm robotic port 7.5 cm inferior to the camera port and another  8-mm robotic port 7.5 cm inferior to this. I placed a 12-mm assistant  port just at the umbilicus and a 5-mm assistant port in the upper  midline. I then docked the [employer].   I then flipped the colon  medially and dissected it from the cava and followed this cephalad to  the renal vein. Just superior to this, I found the renal artery and I  dissected this free. At this point, I then entered through Gerota's  fascia and dissected this off the whole upper half of the kidney. In  the posterior aspect, there was the tumor. I freed up the whole kidney  and then the arterial vasculature as I came into the kidney. I then  freed up the lateral attachments. I used a drop in device to ultrasound  the whole kidney and demarcate the upper limits of the mass and then I  marked out the whole one-third of the upper pole of the kidney. At this  point, the patient had 12.5 of Mannitol. I placed two bulldog clamps on  the renal artery and did an upper pole heminephrectomy sharply excising  the upper pole of the kidney. I then sent this off and set it off to  the side. I then took a Stratafix suture, started laterally, going  medially up and back across the defect, and pulled this tightly. I then  placed an 0 Vicryl stitch and did a horizontal mattress suture starting  laterally, going medially. I then took the bulldog clamps off and my  clamp time was 18 minutes. There were still some more bleeding. Therefore, I took another Stratafix suture and went medially and did  deep sutures back and forth until the bleeding had stopped and tightened  all my sutures at this point. There was good hemostasis. I then placed  the specimen in a bag and put Gerota's fascia back over the kidney and  took out the most inferior port and put a SHANI drain and draped it over  the kidney. At this point, we then undocked the [facility] [employer]. We  then looked out all of the ports, made the umbilical incision just big  enough to get the bag with the specimen out. I used a figure-of-eight 0  Ethibond stitches to close the fascia. All subcutaneous tissue was  closed with 3-0 Vicryls and then 4-0 Monocryl subcuticular at the skin  with Dermabond.   The patient tolerated the procedure well. Estimated  blood loss was 100 mL. PLAN:  We will get SHANI creatinine level in the morning.         Daniel Tabares MD    D: 10/12/2021 17:45:07       T: 10/12/2021 22:04:04     [id number]  Job#: [id number]     Doc#: [id number]    CC:

## 2021-10-13 NOTE — PROGRESS NOTES
Pt assessment completed and charted. VSS. Pt a/o x4. O2 sat remaining 90% on room air. SHANI drain intact w/ bloody output. Dressing changed d/t saturation. F/C draining approprietly. Pt up w/ assist x1. Bed in lowest position and wheels locked. Call light within reach. Bedside table within reach. Non-skid footwear in place. Pt denies any other needs at this time. Pt calls out appropriately. Will continue to monitor.

## 2021-10-13 NOTE — PROGRESS NOTES
Pt arrived to room. Alert and oriented, VSS. Pt oriented to room, belongings put away. Told plan of care, all questions answered. C/o pain, just wanting to use lidoderm patch and ice pack at this time. SHANI and ravi in place. Call light within reach. Bed locked and in lowest position. Will continue to monitor.

## 2021-10-13 NOTE — PROGRESS NOTES
F/c bothering pt, requesting for it to be removed. Call to urology and verbal approval for it to be removed. Removed without complication. Hat placed in toilet, instructed to call out if needs assistance getting up. Will continue to monitor.

## 2021-10-14 VITALS
DIASTOLIC BLOOD PRESSURE: 76 MMHG | RESPIRATION RATE: 16 BRPM | TEMPERATURE: 97.9 F | SYSTOLIC BLOOD PRESSURE: 132 MMHG | HEART RATE: 94 BPM | HEIGHT: 63 IN | BODY MASS INDEX: 30.83 KG/M2 | OXYGEN SATURATION: 97 % | WEIGHT: 174 LBS

## 2021-10-14 LAB
GLUCOSE BLD-MCNC: 163 MG/DL (ref 70–99)
GLUCOSE BLD-MCNC: 178 MG/DL (ref 70–99)
PERFORMED ON: ABNORMAL
PERFORMED ON: ABNORMAL

## 2021-10-14 PROCEDURE — 6360000002 HC RX W HCPCS: Performed by: UROLOGY

## 2021-10-14 PROCEDURE — 6370000000 HC RX 637 (ALT 250 FOR IP): Performed by: UROLOGY

## 2021-10-14 PROCEDURE — 2580000003 HC RX 258: Performed by: UROLOGY

## 2021-10-14 RX ORDER — SENNA AND DOCUSATE SODIUM 50; 8.6 MG/1; MG/1
1 TABLET, FILM COATED ORAL DAILY
Qty: 14 TABLET | Refills: 0 | Status: SHIPPED | OUTPATIENT
Start: 2021-10-14 | End: 2022-07-18

## 2021-10-14 RX ORDER — OXYCODONE HYDROCHLORIDE AND ACETAMINOPHEN 5; 325 MG/1; MG/1
1 TABLET ORAL EVERY 8 HOURS PRN
Qty: 9 TABLET | Refills: 0 | Status: SHIPPED | OUTPATIENT
Start: 2021-10-14 | End: 2021-10-17

## 2021-10-14 RX ADMIN — LEVOTHYROXINE SODIUM 25 MCG: 0.03 TABLET ORAL at 05:47

## 2021-10-14 RX ADMIN — Medication 10 ML: at 09:08

## 2021-10-14 RX ADMIN — CETIRIZINE HYDROCHLORIDE 10 MG: 10 TABLET, FILM COATED ORAL at 09:08

## 2021-10-14 RX ADMIN — ENOXAPARIN SODIUM 40 MG: 40 INJECTION SUBCUTANEOUS at 09:08

## 2021-10-14 RX ADMIN — INSULIN LISPRO 2 UNITS: 100 INJECTION, SOLUTION INTRAVENOUS; SUBCUTANEOUS at 12:18

## 2021-10-14 RX ADMIN — INSULIN LISPRO 2 UNITS: 100 INJECTION, SOLUTION INTRAVENOUS; SUBCUTANEOUS at 09:20

## 2021-10-14 ASSESSMENT — PAIN SCALES - GENERAL: PAINLEVEL_OUTOF10: 0

## 2021-10-14 NOTE — PROGRESS NOTES
50ml serosanguineous fluid drained from SHANI drain. Dressing changed due to soiled site. Will monitor.

## 2021-10-14 NOTE — CARE COORDINATION
Chart reviewed. Patient eager to d/c. Regular diet. Ambulatory in room. No DCP needs. Waiting MD rounding.  Soren Andre RN

## 2021-10-14 NOTE — DISCHARGE SUMMARY
Physician Discharge Summary     Patient ID:  Robinson Gerber  [id number]  65 y.o.  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 Voiding well  Pain control  Dc lm drain and rodrigues  Dc home as per prog notes    Consults: none      Discharge Exam:    Physical Exam:   Constitutional: pleasant male in no acute distress, normal body habitus  Eyes: pink conjunctivae, no ptosis  ENT: lips without cyanosis, normal external appearance of ears and nose  Lungs: normal respiratory movements, no audible wheezes   Abdomen: soft, NTND   Genitourinary: stable  Vascular: lower extremities without edema, sequential compression devices in place   Musculoskeletal: no digital cyanosis, head normocephalic  Psych: normal mood and affect, alert and appropriate, answering questions      Disposition: home    In process/preliminary results:  Outstanding Order Results     Date and Time Order Name Status Description    10/12/2021  3:17 PM Surgical Pathology In process           Patient Instructions:   Discharge Medication List as of 10/14/2021  2:12 PM      START taking these medications    Details   oxyCODONE-acetaminophen (PERCOCET) 5-325 MG per tablet Take 1 tablet by mouth every 8 hours as needed for Pain for up to 3 days. Intended supply: 3 days.  Take lowest dose possible to manage pain, Disp-9 tablet, R-0Print      sennosides-docusate sodium (SENOKOT-S) 8.6-50 MG tablet Take 1 tablet by mouth daily, Disp-14 tablet, R-0Print         CONTINUE these medications which have NOT CHANGED    Details   dapagliflozin (FARXIGA) 10 MG tablet TAKE 1 TABLET BY MOUTH EVERY MORNING, Disp-90 tablet, R-0Normal      Insulin Glargine, 2 Unit Dial, (TOUJEO MAX SOLOSTAR) 300 UNIT/ML SOPN Inject 50 Units into the skin nightly, Disp-4 pen, R-3Normal      Semaglutide,0.25 or 0.5MG/DOS, (OZEMPIC, 0.25 OR 0.5 MG/DOSE,) 2 MG/1.5ML SOPN INJECT 0.5MG INTO THE SKIN ONCE WEEKLY, Disp-3 mL, R-3Normal      lisinopril (PRINIVIL;ZESTRIL) 10 MG tablet Take 10 mg by mouth daily Historical Med      blood glucose test strips (ONETOUCH ULTRA) strip USE TO TEST BLOOD SUGAR TWICE DAILY AS DIRECTED, Disp-100 strip, R-3Normal      !! Insulin Pen Needle 32G X 4 MM MISC DAILY Starting Mon 12/28/2020, Disp-100 each, R-3, Normal      !! Insulin Pen Needle (B-D UF III MINI PEN NEEDLES) 31G X 5 MM MISC Disp-100 each, R-2, NormalUse for toujeo 3 times daily plus with ozempic once a week      levothyroxine (SYNTHROID) 25 MCG tablet Take 1 tablet by mouth Daily, Disp-30 tablet, R-3Normal      DULoxetine (CYMBALTA) 30 MG extended release capsule Take 30 mg by mouth nightly Historical Med      atorvastatin (LIPITOR) 40 MG tablet Take 40 mg by mouth nightly Historical Med      aspirin 81 MG tablet Take 81 mg by mouth nightly Historical Med      loratadine (CLARITIN) 10 MG capsule Take 10 mg by mouth dailyHistorical Med      docusate sodium (COLACE) 100 MG capsule Take 100 mg by mouth nightly Historical Med       !! - Potential duplicate medications found. Please discuss with provider.         Activity: light walking  Wound Care: keep wound clean and dry        10/14/2021  10:18 PM

## 2021-10-14 NOTE — PROGRESS NOTES
Pt requesting for SHANI to be removed, noted creatine level to be 0.7. Page to on call urologist. D/t output over shift the on call urologist wanted to still leave in overnight.

## 2021-10-14 NOTE — PROGRESS NOTES
Patient educated on all discharge orders including medications, prescriptions, follow up appointments and general nursing care interventions. Patient's IV access removed. Patient's SHANI drain removed per orders. Patient tolerated well. Patient's belongings sent with patient. Patient verbalized understanding of all discharge instructions. Patient discharged via wheelchair in stable condition.

## 2021-10-14 NOTE — PLAN OF CARE
Verbalizes adequate pain relief with oxycodone as ordered.   Problem: Pain:  Goal: Patient's pain/discomfort is manageable  Description: Patient's pain/discomfort is manageable  Outcome: Ongoing  Goal: Pain level will decrease  Description: Pain level will decrease  Outcome: Ongoing  Goal: Control of acute pain  Description: Control of acute pain  Outcome: Ongoing

## 2021-10-15 NOTE — PROGRESS NOTES
Pt alert and oriented, VSS. Shift assessment completed and documented. Pain controlled, tolerated diet, still voiding fine post rodrigues removal. Moved to A1, report called to RN, RN calling urology for discharge order per pt request since pt is anxious to d/c. Denies any further needs at this time.

## 2021-10-25 ENCOUNTER — TELEPHONE (OUTPATIENT)
Dept: ENDOCRINOLOGY | Age: 61
End: 2021-10-25

## 2021-11-11 ENCOUNTER — VIRTUAL VISIT (OUTPATIENT)
Dept: ENDOCRINOLOGY | Age: 61
End: 2021-11-11
Payer: MEDICARE

## 2021-11-11 DIAGNOSIS — E11.69 DIABETES MELLITUS TYPE 2 IN OBESE (HCC): ICD-10-CM

## 2021-11-11 DIAGNOSIS — E03.8 HYPOTHYROIDISM DUE TO HASHIMOTO'S THYROIDITIS: Primary | ICD-10-CM

## 2021-11-11 DIAGNOSIS — E66.9 DIABETES MELLITUS TYPE 2 IN OBESE (HCC): ICD-10-CM

## 2021-11-11 DIAGNOSIS — E66.01 CLASS 2 SEVERE OBESITY WITH SERIOUS COMORBIDITY IN ADULT, UNSPECIFIED BMI, UNSPECIFIED OBESITY TYPE (HCC): ICD-10-CM

## 2021-11-11 DIAGNOSIS — E06.3 HYPOTHYROIDISM DUE TO HASHIMOTO'S THYROIDITIS: Primary | ICD-10-CM

## 2021-11-11 PROCEDURE — G8427 DOCREV CUR MEDS BY ELIG CLIN: HCPCS | Performed by: NURSE PRACTITIONER

## 2021-11-11 PROCEDURE — 99214 OFFICE O/P EST MOD 30 MIN: CPT | Performed by: NURSE PRACTITIONER

## 2021-11-11 PROCEDURE — 1111F DSCHRG MED/CURRENT MED MERGE: CPT | Performed by: NURSE PRACTITIONER

## 2021-11-11 PROCEDURE — 3044F HG A1C LEVEL LT 7.0%: CPT | Performed by: NURSE PRACTITIONER

## 2021-11-11 PROCEDURE — 3017F COLORECTAL CA SCREEN DOC REV: CPT | Performed by: NURSE PRACTITIONER

## 2021-11-11 PROCEDURE — 2022F DILAT RTA XM EVC RTNOPTHY: CPT | Performed by: NURSE PRACTITIONER

## 2021-11-11 RX ORDER — SEMAGLUTIDE 1.34 MG/ML
INJECTION, SOLUTION SUBCUTANEOUS
Qty: 3 PEN | Refills: 3 | Status: SHIPPED | OUTPATIENT
Start: 2021-11-11

## 2021-11-11 RX ORDER — INSULIN GLARGINE 300 U/ML
50 INJECTION, SOLUTION SUBCUTANEOUS NIGHTLY
Qty: 10 PEN | Refills: 2 | Status: SHIPPED | OUTPATIENT
Start: 2021-11-11

## 2021-11-11 ASSESSMENT — ENCOUNTER SYMPTOMS
SHORTNESS OF BREATH: 0
DIARRHEA: 0
COLOR CHANGE: 0
EYE PAIN: 0
CONSTIPATION: 0
NAUSEA: 0

## 2021-11-11 NOTE — ASSESSMENT & PLAN NOTE
Lab Results   Component Value Date    LABA1C 6.8 10/12/2021     Lab Results   Component Value Date    .5 10/12/2021     Discussed titration with Concetta Husain to goal @ 6.5  Declined humalog as a short term med  May increase ozempic at next visit if not at goal  R/t stress and associated dietary non compliance per patient  Avoid hypoglycemia     Medication: no change, will improve diet and make necessary lifestyle changes  Discussed titration of FBG to  with Toujeo  Humalog : cover prandial BG with medium dose SSI  Continue ozempic and trulicity as currently     Diet :   30-40 grams per meal , 3 meals per day, avoid carbs in snack choices  Include moderate proteins and good fats   Ensure adequate hydration and  electrolyte replacement     Exercise :  Recommended exercise is 5-7 days a week for 30-60 mins at least, per day OR a total of 2.5 hours per week , which ever is more feasible.     Diabetic Health Maintenance  Follow up with annual eye exams  Follow up with annual podiatry exams if needed  Reviewed sick day management of BG      Other areas of Diabetic Education reviewed:  · Carbs: good carbs and bad carbs, importance of carb counting, incorporation of protein with each meal to reduce Glycemic index, importance of portions, Carb/insulin ratio  · Fats: Good fats and bad fats, meal planning and supplements.   · Discussed how food affects blood sugar readings.   · Different diabetic medications  · Managing high and low sugar readings  · Rotation of sites for subcutaneous medication injection

## 2021-11-11 NOTE — PROGRESS NOTES
Endocrinology  Alisson Palacios CNP, [address]  [place], [address]  Phone [phone]  Fax [phone]    Bart Jennings is  being evaluated by a Virtual Visit (video visit) encounter to address concerns as mentioned above. Due to this being a TeleHealth encounter (During XVDPO-81 public health emergency), evaluation of the following organ systems was limited: Vitals/Constitutional/EENT/Resp/CV/GI//MS/Neuro/Skin/Heme-Lymph-Imm. Pursuant to the emergency declaration under the [facility], [address] [place] [address] authority and the [employer] Act, this Virtual Visit was conducted with patient's (and/or legal guardian's) consent, to reduce the patient's risk of exposure to COVID-19 and provide necessary medical care. The patient (and/or legal guardian) has also been advised to contact this office for worsening conditions or problems, and seek emergency medical treatment and/or call 911 if deemed necessary. Services were provided through a video synchronous discussion virtually to substitute for in-person clinic visit. Patient and provider were located at their individual homes    Patient was identified via name,  on the video visit    Bart Jennings is a 59 y.o. female who is a new patient following up for management of Diabetes Mellitus Type 2.     PCP: MARY Jon    A1c: 6.8%--> 6.5%--> 6.5%--> 6.7%--> 7%--> 6.8%    Last BP Readings:  BP Readings from Last 3 Encounters:   10/14/21 132/76   10/12/21 138/75   02/10/21 118/78     Last LDL:   Lab Results   Component Value Date    LDLCALC 87 07/15/2010     Aspirin Use: 81 mg    Tobacco/Alcohol History:    Smoking status: Current Every Day Smoker     Packs/day: 1.00     Types: Cigarettes    Smokeless tobacco: Never Used    Alcohol use: Not on file    Drug use: Not on file     HPI on 2/10/2021  Reports no new concerns  Has a plantars wart, preventing her from exercise  Continues to titrate tresiba @ 40 units QHS  About 10 lbs weight loss: on low CHO diet      Diabetes:  Maico Xiong  has been diabetic for 15  years and on insulin for  10 years. Patient reports that diabetes is currently uncontrolled. Disease course has been variable  Current microvascular complications include none  Current Macrovascular complications include none; no h/o CAD, PVD  Patient reports compliance for about 90% of the time and adheres to medication, but usually not to diet and exercise instructions. There have been no recent hospital or ED admissions for hypoglycemia, hyperglycemia or DKA. Thyroid:   H/o hypothyroidism symptoms for 2-3 years and is currently on 25 mcg QD  Current symptoms include fatigue, feeling cold and cold intolerance, constipation, anxiousness, depression, ocular symptoms. Significant SI /depression in past, is doing well on Cymbalta per PCP.   Patient denies diarrhea, goiter,  She is > 15 years postmenopausal.     Obstructive symptoms  - Change in voice: hoarse  - Trouble swallowing: none    Predisposing factors for thyroid disease  Exposure to radiation (neck): no  Exposure to iodine: none, [place]  FH of thyroid disease: sister, niece on synthroid  FH of thyroid or other cancers: mom ( lymphoma)     Lab Results   Component Value Date    TSH 2.44 05/01/2020    TSH 9.11 01/25/2019    TSH 2.76 07/15/2010    FT3 3.1 01/25/2019    T4FREE 1.4 05/01/2020    T4FREE 0.8 01/25/2019     PMH includes  Past Medical History:   Diagnosis Date    Allergic rhinitis     Arthritis     Diabetes mellitus type 2 in nonobese ([address]     High blood pressure     Hyperlipidemia     Neoplasm of right kidney      Blood glucose trends:  Patient brought log glucometer for download  Readings per day  2 per patient report  Average reading 200s  Lowest in past 2 weeks 55  Highest in past 2 weeks > 250  Hypoglycemia awareness and symptoms: shaky, off balance  Has not done CGM    Current Medication regimen:   Farxiga 10 mg QD  Ozempic 0.5 mg-->has s/e @ 1 mg  Toujeo 70 units QHS--> 58 units--> 40 units->44units    Other meds tried in past: 2;  Basaglar: 70-80 units BID--> takes about 146 units;  Januvia 100 mg stopped when switched to GLP1  GFR > 60    Current Dietary regimen:   BF: no  Lunch: potatoes, cheese, chicken-->   Dinner: usually skips  Snacks: potato chips/cheese  Beverages: crystal light  Average carbs per day 20-40   Usual carbs per meal    Microvascular Complications:  · Neuropathy: denies concerns; has lower back pain and sciatica. · Retinopathy: no concerns with vision, field of vision  · Nephropathy: no albuminuria ( see media labs). Diabetic Health Maintenance   · Last Eye Exam: > 6 months ago, h/o eye surgery for astigmatism, \"glaucoma suspect\"  · Last Foot exam: no concerns, has neuropathy r/t back pain  · Has patient seen a dietitian? Yes  · Current Exercise: No structured exercise  · On ACEI or ARB: Prinzide 10 -12.5 mg  · On statin: Lipitor 40 mg    Hyperlipidemia: Current complaints include occasional myalgias but otherwise tolerates well. Lab Results   Component Value Date    CHOL 155 07/15/2010     Lab Results   Component Value Date    TRIG 157 07/15/2010     Lab Results   Component Value Date    HDL 37 07/15/2010     Lab Results   Component Value Date    LDLCALC 87 07/15/2010     No results found for: LDLDIRECT  No results found for: CHOLHDLRATIO    Vitamin D deficiency: Currently is on 5000IU qD. Current complaints include fatigue on daily basis. Last vitamin Dlevel is: 53  No results found for: VD23T, VITD3, VD25, VITD25    Hypertension   Controlled , denies symptoms of dizziness, light headedness. Occasional dependent edema. Tries to follow a salt restricted diet.    Lab Results   Component Value Date     10/13/2021    K 4.8 10/13/2021     10/13/2021    CO2 23 10/13/2021    BUN 15 10/13/2021    CREATININE 0.8 10/13/2021    GLUCOSE 132 (H) 10/13/2021    CALCIUM 8.9 10/13/2021    PROT 7.0 07/15/2010    LABALBU 4.3 07/15/2010    BILITOT 0.20 07/15/2010    ALKPHOS 47 07/15/2010    AST 20 07/15/2010    ALT 21 07/15/2010    LABGLOM >60 10/13/2021    GFRAA >60 10/13/2021    AGRATIO 1.6 07/15/2010     The ASCVD Risk score (Frederic Meredith, et al., 2013) failed to calculate for the following reasons:    Cannot find a previous HDL lab    Cannot find a previous total cholesterol lab    Family History   Problem Relation Age of Onset    Heart Disease Mother     Diabetes Mother     Heart Disease Father     Heart Disease Brother         Review of Systems   Constitutional: Negative for activity change, appetite change, diaphoresis, fever and unexpected weight change. HENT: Negative for dental problem. Eyes: Negative for pain and visual disturbance. Respiratory: Negative for shortness of breath. Cardiovascular: Negative for chest pain, palpitations and leg swelling. Gastrointestinal: Negative for constipation, diarrhea and nausea. Endocrine: Negative for cold intolerance, heat intolerance, polydipsia, polyphagia and polyuria. Genitourinary: Negative for frequency and urgency. Musculoskeletal: Negative for arthralgias, joint swelling and myalgias. Skin: Negative for color change and pallor. Neurological: Negative for weakness, numbness and headaches. Psychiatric/Behavioral: Negative for dysphoric mood and sleep disturbance. The patient is not nervous/anxious. There were no vitals filed for this visit. televisit    Physical Exam  Vitals reviewed. Constitutional:       Appearance: She is well-developed. Eyes:      Pupils: Pupils are equal, round, and reactive to light. Neck:      Thyroid: No thyromegaly. Cardiovascular:      Rate and Rhythm: Normal rate and regular rhythm. Heart sounds: Normal heart sounds.    Pulmonary:      Effort: Pulmonary effort is normal.      Breath sounds: Normal breath sounds. Abdominal:      General: There is no distension. Musculoskeletal:         General: Normal range of motion. Cervical back: Normal range of motion. Skin:     General: Skin is warm and dry. Comments: No skin changes or evidence of trauma. Neurological:      Mental Status: She is alert and oriented to person, place, and time. Sensory: No sensory deficit. Psychiatric:         Behavior: Behavior normal.         Thought Content: Thought content normal.        Skeletal foot exam:   Plantars wart on left foot     Assessment  Destiny Tolbert is a 59 y.o. female with uncontrolled Diabetes Mellitus type 2 complicated by neuropathy and associated with Hashimoto's hypothyroidism. Recent partial nephrectomy (R) for RCC.  Follow up in 3 months    Plan  Problem List Items Addressed This Visit     Class 2 obesity in adult    Relevant Medications    dapagliflozin (FARXIGA) 10 MG tablet    Insulin Glargine, 2 Unit Dial, (TOUJEO MAX SOLOSTAR) 300 UNIT/ML SOPN    Semaglutide,0.25 or 0.5MG/DOS, (OZEMPIC, 0.25 OR 0.5 MG/DOSE,) 2 MG/1.5ML SOPN    Diabetes mellitus type 2 in obese [facility])     Lab Results   Component Value Date    LABA1C 6.8 10/12/2021     Lab Results   Component Value Date    .5 10/12/2021     Discussed titration with Concetta Husain to goal @ 6.5  Declined humalog as a short term med  May increase ozempic at next visit if not at goal  R/t stress and associated dietary non compliance per patient  Avoid hypoglycemia     Medication: no change, will improve diet and make necessary lifestyle changes  Discussed titration of FBG to  with Toujeo  Humalog : cover prandial BG with medium dose SSI  Continue ozempic and trulicity as currently     Diet :   30-40 grams per meal , 3 meals per day, avoid carbs in snack choices  Include moderate proteins and good fats   Ensure adequate hydration and  electrolyte replacement     Exercise :  Recommended exercise is 5-7 days a week for 30-60 mins at least, per day OR a total of 2.5 hours per week , which ever is more feasible.     Diabetic Health Maintenance  Follow up with annual eye exams  Follow up with annual podiatry exams if needed  Reviewed sick day management of BG      Other areas of Diabetic Education reviewed:  · Carbs: good carbs and bad carbs, importance of carb counting, incorporation of protein with each meal to reduce Glycemic index, importance of portions, Carb/insulin ratio  · Fats: Good fats and bad fats, meal planning and supplements. · Discussed how food affects blood sugar readings.   · Different diabetic medications  · Managing high and low sugar readings  · Rotation of sites for subcutaneous medication injection               Relevant Medications    dapagliflozin (FARXIGA) 10 MG tablet    Insulin Glargine, 2 Unit Dial, (TOUJEO MAX SOLOSTAR) 300 UNIT/ML SOPN    Semaglutide,0.25 or 0.5MG/DOS, (OZEMPIC, 0.25 OR 0.5 MG/DOSE,) 2 MG/1.5ML SOPN    Hypothyroidism due to Hashimoto's thyroiditis - Primary      Repeat TFT Q 6 months with PCP  Further management based on results  Ensure off of biotin for 2-3 days prior to labs               Return in about 6 months (around 5/11/2022).

## 2021-11-11 NOTE — ASSESSMENT & PLAN NOTE
Repeat TFT Q 6 months with PCP  Further management based on results  Ensure off of biotin for 2-3 days prior to labs

## 2021-11-12 ENCOUNTER — TELEPHONE (OUTPATIENT)
Dept: ENDOCRINOLOGY | Age: 61
End: 2021-11-12

## 2021-11-12 NOTE — TELEPHONE ENCOUNTER
Submitted PA for Ozempic (0.25 or 0.5 MG/DOSE) 2MG/1.5ML pen-injector  Key: [id number]   Via CMM STATUS: This medication or product is on your plan's list of covered drugs. Prior authorization is not required at this time.

## 2022-02-02 DIAGNOSIS — E66.9 DIABETES MELLITUS TYPE 2 IN OBESE (HCC): ICD-10-CM

## 2022-02-02 DIAGNOSIS — E11.69 DIABETES MELLITUS TYPE 2 IN OBESE (HCC): ICD-10-CM

## 2022-02-02 RX ORDER — BLOOD SUGAR DIAGNOSTIC
STRIP MISCELLANEOUS
Qty: 100 STRIP | Refills: 3 | Status: SHIPPED | OUTPATIENT
Start: 2022-02-02

## 2022-02-02 NOTE — TELEPHONE ENCOUNTER
Medication:   Requested Prescriptions     Pending Prescriptions Disp Refills    blood glucose test strips (ONETOUCH ULTRA) strip [Pharmacy Med Name: [id number] GoInstant [facility]50] 100 strip 3     Sig: TEST BLOOD SUGAR TWICE DAILY AS DIRECTED       Last Filled:   02/01/2021    Patient Phone Number: [phone] (home) [phone] (work)    Last appt: 11/11/2021   Next appt: 05/05/2022    Last Labs DM:   Lab Results   Component Value Date    LABA1C 6.8 10/12/2021

## 2022-04-12 ENCOUNTER — HOSPITAL ENCOUNTER (OUTPATIENT)
Dept: GENERAL RADIOLOGY | Age: 62
Discharge: HOME | End: 2022-04-12
Payer: MEDICARE

## 2022-04-12 ENCOUNTER — HOSPITAL ENCOUNTER (OUTPATIENT)
Age: 62
Discharge: HOME | End: 2022-04-12
Payer: MEDICARE

## 2022-04-12 DIAGNOSIS — C64.1 MALIGNANT NEOPLASM OF RIGHT KIDNEY, EXCEPT RENAL PELVIS (HCC): ICD-10-CM

## 2022-04-12 LAB
BUN BLDV-MCNC: 12 MG/DL (ref 7–20)
CREAT SERPL-MCNC: 0.9 MG/DL (ref 0.6–1.2)
GFR AFRICAN AMERICAN: >60
GFR NON-AFRICAN AMERICAN: >60

## 2022-04-12 PROCEDURE — 71046 X-RAY EXAM CHEST 2 VIEWS: CPT

## 2022-04-12 PROCEDURE — 84520 ASSAY OF UREA NITROGEN: CPT

## 2022-04-12 PROCEDURE — 36415 COLL VENOUS BLD VENIPUNCTURE: CPT

## 2022-04-12 PROCEDURE — 82565 ASSAY OF CREATININE: CPT

## 2022-04-14 ENCOUNTER — HOSPITAL ENCOUNTER (OUTPATIENT)
Age: 62
Discharge: HOME | End: 2022-04-14
Payer: MEDICARE

## 2022-04-14 ENCOUNTER — HOSPITAL ENCOUNTER (OUTPATIENT)
Dept: CT IMAGING | Age: 62
Discharge: HOME | End: 2022-04-14
Payer: MEDICARE

## 2022-04-14 DIAGNOSIS — C64.1 MALIGNANT NEOPLASM OF RIGHT KIDNEY, EXCEPT RENAL PELVIS (HCC): ICD-10-CM

## 2022-04-14 DIAGNOSIS — N28.1 CYST OF KIDNEY, ACQUIRED: ICD-10-CM

## 2022-04-14 PROCEDURE — 74177 CT ABD & PELVIS W/CONTRAST: CPT

## 2022-04-14 PROCEDURE — 6360000004 HC RX CONTRAST MEDICATION: Performed by: UROLOGY

## 2022-04-14 RX ADMIN — IOPAMIDOL 75 ML: 755 INJECTION, SOLUTION INTRAVENOUS at 07:49

## 2022-06-17 ENCOUNTER — OFFICE VISIT (OUTPATIENT)
Dept: OBGYN CLINIC | Age: 62
End: 2022-06-17
Payer: MEDICARE

## 2022-06-17 ENCOUNTER — HOSPITAL ENCOUNTER (OUTPATIENT)
Age: 62
Discharge: HOME | End: 2022-06-17
Payer: MEDICARE

## 2022-06-17 ENCOUNTER — OFFICE VISIT (OUTPATIENT)
Dept: OBGYN CLINIC | Age: 62
End: 2022-06-17

## 2022-06-17 VITALS
HEART RATE: 93 BPM | TEMPERATURE: 97.9 F | BODY MASS INDEX: 32.11 KG/M2 | DIASTOLIC BLOOD PRESSURE: 70 MMHG | HEIGHT: 63 IN | WEIGHT: 181.2 LBS | SYSTOLIC BLOOD PRESSURE: 110 MMHG

## 2022-06-17 DIAGNOSIS — D39.9 NEOPLASM OF UNCERTAIN BEHAVIOR OF FEMALE GENITAL ORGAN, UNSPECIFIED: ICD-10-CM

## 2022-06-17 DIAGNOSIS — D37.4 NEOPLASM OF UNCERTAIN BEHAVIOR OF COLON: ICD-10-CM

## 2022-06-17 DIAGNOSIS — R79.9 ABNORMAL FINDING OF BLOOD CHEMISTRY, UNSPECIFIED: ICD-10-CM

## 2022-06-17 DIAGNOSIS — D37.9 NEOPLASM OF UNCERTAIN BEHAVIOR OF DIGESTIVE ORGAN, UNSPECIFIED: ICD-10-CM

## 2022-06-17 DIAGNOSIS — R19.00 PELVIC MASS IN FEMALE: Primary | ICD-10-CM

## 2022-06-17 DIAGNOSIS — Z76.89 ENCOUNTER TO ESTABLISH CARE WITH NEW DOCTOR: ICD-10-CM

## 2022-06-17 DIAGNOSIS — R93.89 ABNORMAL FINDINGS ON DIAGNOSTIC IMAGING OF OTHER SPECIFIED BODY STRUCTURES: ICD-10-CM

## 2022-06-17 DIAGNOSIS — Z12.31 BREAST CANCER SCREENING BY MAMMOGRAM: ICD-10-CM

## 2022-06-17 DIAGNOSIS — R19.00 PELVIC MASS IN FEMALE: ICD-10-CM

## 2022-06-17 DIAGNOSIS — Z12.4 PAP SMEAR FOR CERVICAL CANCER SCREENING: ICD-10-CM

## 2022-06-17 LAB
A/G RATIO: 2 (ref 1.1–2.2)
ALBUMIN SERPL-MCNC: 4.7 G/DL (ref 3.4–5)
ALP BLD-CCNC: 74 U/L (ref 40–129)
ALT SERPL-CCNC: 29 U/L (ref 10–40)
ANION GAP SERPL CALCULATED.3IONS-SCNC: 20 MMOL/L (ref 3–16)
AST SERPL-CCNC: 22 U/L (ref 15–37)
BASOPHILS ABSOLUTE: 0 K/UL (ref 0–0.2)
BASOPHILS RELATIVE PERCENT: 0.5 %
BILIRUB SERPL-MCNC: 0.3 MG/DL (ref 0–1)
BUN BLDV-MCNC: 17 MG/DL (ref 7–20)
CA 125: 14.8 U/ML (ref 0–35)
CALCIUM SERPL-MCNC: 9.8 MG/DL (ref 8.3–10.6)
CEA: 6.1 NG/ML (ref 0–5)
CHLORIDE BLD-SCNC: 101 MMOL/L (ref 99–110)
CHOLESTEROL, TOTAL: 170 MG/DL (ref 0–199)
CO2: 21 MMOL/L (ref 21–32)
CREAT SERPL-MCNC: 1 MG/DL (ref 0.6–1.2)
EOSINOPHILS ABSOLUTE: 0.1 K/UL (ref 0–0.6)
EOSINOPHILS RELATIVE PERCENT: 1.4 %
GFR AFRICAN AMERICAN: >60
GFR NON-AFRICAN AMERICAN: 56
GLUCOSE BLD-MCNC: 124 MG/DL (ref 70–99)
HCT VFR BLD CALC: 45.5 % (ref 36–48)
HDLC SERPL-MCNC: 36 MG/DL (ref 40–60)
HEMOGLOBIN: 15.3 G/DL (ref 12–16)
LDL CHOLESTEROL CALCULATED: 91 MG/DL
LYMPHOCYTES ABSOLUTE: 2.2 K/UL (ref 1–5.1)
LYMPHOCYTES RELATIVE PERCENT: 29.3 %
MCH RBC QN AUTO: 31.5 PG (ref 26–34)
MCHC RBC AUTO-ENTMCNC: 33.6 G/DL (ref 31–36)
MCV RBC AUTO: 93.7 FL (ref 80–100)
MONOCYTES ABSOLUTE: 0.5 K/UL (ref 0–1.3)
MONOCYTES RELATIVE PERCENT: 7.1 %
NEUTROPHILS ABSOLUTE: 4.7 K/UL (ref 1.7–7.7)
NEUTROPHILS RELATIVE PERCENT: 61.7 %
PDW BLD-RTO: 13.5 % (ref 12.4–15.4)
PLATELET # BLD: 210 K/UL (ref 135–450)
PMV BLD AUTO: 8.6 FL (ref 5–10.5)
POTASSIUM SERPL-SCNC: 5.1 MMOL/L (ref 3.5–5.1)
RBC # BLD: 4.86 M/UL (ref 4–5.2)
SODIUM BLD-SCNC: 142 MMOL/L (ref 136–145)
TOTAL PROTEIN: 7.1 G/DL (ref 6.4–8.2)
TRIGL SERPL-MCNC: 217 MG/DL (ref 0–150)
TSH REFLEX: 1.42 UIU/ML (ref 0.27–4.2)
VLDLC SERPL CALC-MCNC: 43 MG/DL
WBC # BLD: 7.5 K/UL (ref 4–11)

## 2022-06-17 PROCEDURE — 82378 CARCINOEMBRYONIC ANTIGEN: CPT

## 2022-06-17 PROCEDURE — 3017F COLORECTAL CA SCREEN DOC REV: CPT | Performed by: OBSTETRICS & GYNECOLOGY

## 2022-06-17 PROCEDURE — 99204 OFFICE O/P NEW MOD 45 MIN: CPT | Performed by: OBSTETRICS & GYNECOLOGY

## 2022-06-17 PROCEDURE — 84443 ASSAY THYROID STIM HORMONE: CPT

## 2022-06-17 PROCEDURE — 80053 COMPREHEN METABOLIC PANEL: CPT

## 2022-06-17 PROCEDURE — 86301 IMMUNOASSAY TUMOR CA 19-9: CPT

## 2022-06-17 PROCEDURE — G8417 CALC BMI ABV UP PARAM F/U: HCPCS | Performed by: OBSTETRICS & GYNECOLOGY

## 2022-06-17 PROCEDURE — 80061 LIPID PANEL: CPT

## 2022-06-17 PROCEDURE — 86305 HUMAN EPIDIDYMIS PROTEIN 4: CPT

## 2022-06-17 PROCEDURE — 86304 IMMUNOASSAY TUMOR CA 125: CPT

## 2022-06-17 PROCEDURE — 76856 US EXAM PELVIC COMPLETE: CPT | Performed by: OBSTETRICS & GYNECOLOGY

## 2022-06-17 PROCEDURE — 36415 COLL VENOUS BLD VENIPUNCTURE: CPT

## 2022-06-17 PROCEDURE — G8427 DOCREV CUR MEDS BY ELIG CLIN: HCPCS | Performed by: OBSTETRICS & GYNECOLOGY

## 2022-06-17 PROCEDURE — 4004F PT TOBACCO SCREEN RCVD TLK: CPT | Performed by: OBSTETRICS & GYNECOLOGY

## 2022-06-17 PROCEDURE — 83036 HEMOGLOBIN GLYCOSYLATED A1C: CPT

## 2022-06-17 PROCEDURE — 85025 COMPLETE CBC W/AUTO DIFF WBC: CPT

## 2022-06-17 NOTE — PROGRESS NOTES
[facility] Ob/Gyn  Gynecology History and Physical    CC:   Chief Complaint   Patient presents with    New Patient       HPI: Sharron Alicea is a 57 y.o. Robert Tylordy female who presents for evaluation secondary to a pelvic mass found incidentally on CT scan. Patient is new to office, pleasant but concerned. She states she has been following with Dr. Gerald Blizzard (urology) for renal cell carcinoma --she underwent a partial nephrectomy in October 202. On April 14, 2022 she had a pelvic CT which was significant for a contiguous uterine and ovarian mass measuring around 8 cm--results noted below, MRI was commended but not ordered. Patient states that she is not in any pain or has had any vaginal bleeding. But the mass was not addressed at her follow-up. Patient denies any new urinary complaints including dysuria hematuria. She denies flank pain. She denies abnormal vaginal discharge or odor. She is not sexually active. Her last Pap smear was over 10 years ago and she denies any abnormal Paps. Her last period was around age 38 she denies any postmenopausal bleeding since that time. Her family history is significant for a niece with breast cancer. She states that she occasionally does breast exams without any concerning findings but has not had a mammogram before. She denies any history of GYN surgery but records reveal a history of D&C, unknown reasons. She is currently a smoker,  1 pack/day. Review of Systems:   Review of Systems   Constitutional: Negative for appetite change, chills and fever. HENT: Negative for congestion, sneezing and sore throat. Eyes: Negative for visual disturbance. Respiratory: Negative for chest tightness, shortness of breath and wheezing. Cardiovascular: Negative for chest pain, palpitations and leg swelling. Gastrointestinal: Negative for abdominal pain, diarrhea, nausea and vomiting. Endocrine: Negative for heat intolerance.    Genitourinary: Negative for difficulty urinating, dyspareunia, dysuria, menstrual problem and pelvic pain. Musculoskeletal: Negative for back pain, neck pain and neck stiffness. Skin: Negative for color change, pallor and rash. Allergic/Immunologic: Negative for environmental allergies, food allergies and immunocompromised state. Neurological: Negative for light-headedness, numbness and headaches. Hematological: Does not bruise/bleed easily. Psychiatric/Behavioral: Negative for behavioral problems, sleep disturbance and suicidal ideas. Gynecologic History:   N history of abnormal pap smears  N history of of STIs    Menstrual history:  Gynecologic History  Menstrual History:   LMP: No LMP recorded.  Patient is postmenopausal.    Post Menopausal Bleeding: Denies    Sexual History:   Contraception: PM    Currently is not sexually active   N sexual problems    Obstetrical History:  OB History        0    Para   0    Term   0       0    AB   0    Living   0       SAB   0    IAB   0    Ectopic   0    Molar   0    Multiple   0    Live Births   0                Past Medical History:   Past Medical History:   Diagnosis Date    Allergic rhinitis     Arthritis     Autoimmune disorder ([facility]     Cancer ([facility]     Depression     Diabetes mellitus type 2 in nonobese (HCC)     High blood pressure     Hyperlipidemia     Hypothyroidism     Neoplasm of right kidney        Medications:  Current Outpatient Medications   Medication Sig Dispense Refill    blood glucose test strips (ONETOUCH ULTRA) strip TEST BLOOD SUGAR TWICE DAILY AS DIRECTED 100 strip 3    dapagliflozin (FARXIGA) 10 MG tablet TAKE 1 TABLET BY MOUTH EVERY MORNING 90 tablet 2    Insulin Glargine, 2 Unit Dial, (TOUJEO MAX SOLOSTAR) 300 UNIT/ML SOPN Inject 50 Units into the skin nightly 10 pen 2    Semaglutide,0.25 or 0.5MG/DOS, (OZEMPIC, 0.25 OR 0.5 MG/DOSE,) 2 MG/1.5ML SOPN INJECT 0.5MG INTO THE SKIN ONCE WEEKLY 3 pen 3    lisinopril (PRINIVIL;ZESTRIL) 10 MG tablet Take 10 mg by mouth daily       Insulin Pen Needle (B-D UF III MINI PEN NEEDLES) 31G X 5 MM MISC Use for toujeo 3 times daily plus with ozempic once a week 100 each 2    levothyroxine (SYNTHROID) 25 MCG tablet Take 1 tablet by mouth Daily 30 tablet 3    DULoxetine (CYMBALTA) 30 MG extended release capsule Take 30 mg by mouth nightly       atorvastatin (LIPITOR) 40 MG tablet Take 40 mg by mouth nightly       aspirin 81 MG tablet Take 81 mg by mouth nightly       loratadine (CLARITIN) 10 MG capsule Take 10 mg by mouth daily      docusate sodium (COLACE) 100 MG capsule Take 100 mg by mouth nightly       sennosides-docusate sodium (SENOKOT-S) 8.6-50 MG tablet Take 1 tablet by mouth daily 14 tablet 0    Insulin Pen Needle 32G X 4 MM MISC 1 each by Does not apply route daily 100 each 3     No current facility-administered medications for this visit.        Allergies:  Metformin and related and Travoprost    Surgical History:  Past Surgical History:   Procedure Laterality Date    ANKLE FRACTURE SURGERY      BACK SURGERY      \"mid-back\" @     CATARACT REMOVAL WITH IMPLANT Bilateral     DILATION AND CURETTAGE OF UTERUS      EYE SURGERY      KNEE SURGERY      PARTIAL NEPHRECTOMY Right 10/12/2021    DAVINCI RIGHT PARTIAL NEPHRECTOMY WITH ULTRASONIC PROBE performed by Natalee Villarreal MD at [place] [address].         Family History:  Family History   Problem Relation Age of Onset    Heart Disease Mother     Diabetes Mother     Heart Disease Father     Heart Disease Brother        Social History:  Social History     Substance and Sexual Activity   Alcohol Use Never     Social History     Substance and Sexual Activity   Drug Use Never     Social History     Tobacco Use   Smoking Status Current Every Day Smoker    Packs/day: 1.00    Years: 44.00    Pack years: 44.00    Types: Cigarettes   Smokeless Tobacco Never Used       Physical Exam:  /70 (Site: Left Upper Arm, Position: Sitting, Cuff Size: Medium Adult)   Pulse 93   Temp 97.9 °F (36.6 °C) (Infrared)   Ht 5' 3\" (1.6 m)   Wt 181 lb 3.2 oz (82.2 kg)   BMI 32.10 kg/m²   General: Alert, well appearing, no acute distress  Head: Normocephalic, atraumatic  Mouth: Mucous membranes moist, pharynx normal without lesions  Thyroid: No thyromegaly or masses present   Breasts: Symmetric, non-tender to palpation, no skin changes, palpable axillary lymph nodes or masses noted  Lungs: Respiratory effort normal  CV: Regular rate   Abdomen: Soft, nontender, nondistended   Pelvic:   External: normal appearing genitalia without masses, tenderness or lesions  Vagina: moist, pink mucosa, without abnormal discharge or lesions  Cervix: no masses or lesions visualized, no cervical motion tenderness  Uterus: mobile, midline, no masses palpated  Adnexa: mobile, non-tender to palpation, some fullness noted in the left posterior vagina adjacent consistent with left adnexal mass  Rectovaginal: deferred  Extremities: No redness or tenderness, neg Eric's sign  Skin: Well perfused, normal coloration and turgor, no lesions or rashes visualized  Neuro: Alert, oriented, normal speech, no focal deficits, moves extremities appropriately  Osteopathic: No TART changes    Labs:  Hospital Outpatient Visit on 06/17/2022   Component Date Value    WBC 06/17/2022 7.5     RBC 06/17/2022 4.86     Hemoglobin 06/17/2022 15.3     Hematocrit 06/17/2022 45.5     MCV 06/17/2022 93.7     MCH 06/17/2022 31.5     MCHC 06/17/2022 33.6     RDW 06/17/2022 13.5     Platelets 30/18/9260 210     MPV 06/17/2022 8.6     Neutrophils % 06/17/2022 61.7     Lymphocytes % 06/17/2022 29.3     Monocytes % 06/17/2022 7.1     Eosinophils % 06/17/2022 1.4     Basophils % 06/17/2022 0.5     Neutrophils Absolute 06/17/2022 4.7     Lymphocytes Absolute 06/17/2022 2.2     Monocytes Absolute 06/17/2022 0.5     Eosinophils Absolute 06/17/2022 0.1     Basophils Absolute 06/17/2022 0.0     Sodium ovary is present. The left ovary measures 7.44 cm x 5.33 cm x 3.23 cm. Ovary findings: Complex cyst seen measuring 5.0 cm x 3.7 cm x 4.2 cm. The left adnexa is normal.    IMPRESSION: Heterogenous uterus. Normal right ovary. Left ovarian complex cyst.    Images:  Narrative   EXAMINATION:   CT OF THE ABDOMEN AND PELVIS WITH CONTRAST 4/14/2022 7:41 am       TECHNIQUE:   CT of the abdomen and pelvis was performed with the administration of   intravenous contrast. Multiplanar reformatted images are provided for review. Dose modulation, iterative reconstruction, and/or weight based adjustment of   the mA/kV was utilized to reduce the radiation dose to as low as reasonably   achievable.       COMPARISON:   09/07/2021       HISTORY:   ORDERING SYSTEM PROVIDED HISTORY: Cyst of kidney, acquired   TECHNOLOGIST PROVIDED HISTORY:   Additional Contrast?->None   STAT Creatinine as needed:->No   Reason for Exam: Malignant neoplasm of right kidney FOLLOW UP       FINDINGS:   Lower Chest: There is no consolidation or effusion.       Organs: Patient has undergone interval partial right nephrectomy.  Multiple   benign bilateral renal simple renal cysts are present for which no additional   imaging is recommended.  The remainder of the solid abdominal organs are   unremarkable.       GI/Bowel: There is no bowel dilatation, wall thickening or obstruction.       Pelvis: There is a complex 8 by 3.6 cm partially calcified cystic/solid mass   within the left adnexa, contiguous with and inseparable from the uterus.  The   bladder is decompressed and thus not evaluated.       Peritoneum/Retroperitoneum: There is no free air, free fluid or   intraperitoneal inflammatory change.  There is no adenopathy.       Bones/Soft Tissues:  There is no acute fracture or aggressive osseous lesion.           Impression   Complex 8 cm left pelvic mass.  MRI recommended for further evaluation.       RECOMMENDATIONS:   Unavailable         Assessment/Plan: Diagnosis Orders   1. Pelvic mass in female  CBC with Auto Differential    Comprehensive Metabolic Panel    Hemoglobin A1C    LIPID PANEL    TSH with Reflex        Human Epididymis Protein 4 (HE-4)    CEA    Cancer Antigen 19-9    MRI PELVIS W WO CONTRAST    MTAY - Rodrigue Crenshaw DO, Gynecologic Oncology, [facility]   2. Pap smear for cervical cancer screening  PAP SMEAR   3. Encounter to establish care with new doctor     4. Abnormal finding of blood chemistry, unspecified   Hemoglobin A1C    LIPID PANEL    TSH with Reflex        CEA    Cancer Antigen 19-9   5. Abnormal findings on diagnostic imaging of other specified body structures   TSH with Reflex   6. 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 Neoplasm of uncertain behavior of colon   CEA   8. Neoplasm of uncertain behavior of digestive organ, unspecified   Cancer Antigen 19-9   9. Breast cancer screening by mammogram       - Reviewed US findings in comparison to previous CT in office, significant for posterior fibroid, complex left ovary w/ papillations, however normal vascularity appreciated. - Ref Den (GYN ONC) for second opinion due to complex left ovary -- reviewed that if he is concerned for possible malignancy   - MRI and tumor markers pending   - PAP pending   - Ordered labs from PCP's office for patient convenience, will send on results (CBC, CMP, TSH, A1c, Lipids)   - Pt also in need for mammogram -- ordered     Follow Up  - Will call patient with results   -Return for After seeing GYN ONC if needed .      Molly Jasso DO

## 2022-06-18 LAB
ESTIMATED AVERAGE GLUCOSE: 154.2 MG/DL
HBA1C MFR BLD: 7 %
HUMAN EPIDIDYMIS PROTEIN 4: 173 PMOL/L (ref 0–140)

## 2022-06-21 ENCOUNTER — HOSPITAL ENCOUNTER (OUTPATIENT)
Dept: MRI IMAGING | Age: 62
Discharge: HOME | End: 2022-06-21
Payer: MEDICARE

## 2022-06-21 ENCOUNTER — HOSPITAL ENCOUNTER (OUTPATIENT)
Dept: MAMMOGRAPHY | Age: 62
Discharge: HOME | End: 2022-06-21
Payer: MEDICARE

## 2022-06-21 DIAGNOSIS — R19.00 PELVIC MASS IN FEMALE: ICD-10-CM

## 2022-06-21 DIAGNOSIS — Z12.31 BREAST CANCER SCREENING BY MAMMOGRAM: ICD-10-CM

## 2022-06-21 LAB
CA 19-9: 15 U/ML (ref 0–35)
HPV COMMENT: NORMAL
HPV TYPE 16: NOT DETECTED
HPV TYPE 18: NOT DETECTED
HPVOH (OTHER TYPES): NOT DETECTED

## 2022-06-21 PROCEDURE — 77063 BREAST TOMOSYNTHESIS BI: CPT

## 2022-06-21 PROCEDURE — 6360000004 HC RX CONTRAST MEDICATION: Performed by: OBSTETRICS & GYNECOLOGY

## 2022-06-21 PROCEDURE — 72197 MRI PELVIS W/O & W/DYE: CPT

## 2022-06-21 PROCEDURE — A9579 GAD-BASE MR CONTRAST NOS,1ML: HCPCS | Performed by: OBSTETRICS & GYNECOLOGY

## 2022-06-21 RX ADMIN — GADOTERIDOL 15 ML: 279.3 INJECTION, SOLUTION INTRAVENOUS at 16:41

## 2022-06-22 ENCOUNTER — TELEPHONE (OUTPATIENT)
Dept: MAMMOGRAPHY | Age: 62
End: 2022-06-22

## 2022-06-22 ENCOUNTER — TELEPHONE (OUTPATIENT)
Dept: OBGYN CLINIC | Age: 62
End: 2022-06-22

## 2022-06-22 DIAGNOSIS — R92.8 ABNORMAL MAMMOGRAM OF BOTH BREASTS: Primary | ICD-10-CM

## 2022-06-22 DIAGNOSIS — R92.8 ABNORMAL MAMMOGRAM: ICD-10-CM

## 2022-06-22 NOTE — TELEPHONE ENCOUNTER
With the patient's permission, left message regarding abnormal screening mammogram results and the radiologist recommendation for a bilateral breast ultrasound. Number given to department to set up follow up appointments.

## 2022-06-23 ASSESSMENT — ENCOUNTER SYMPTOMS
NAUSEA: 0
SORE THROAT: 0
WHEEZING: 0
DIARRHEA: 0
VOMITING: 0
COLOR CHANGE: 0
ABDOMINAL PAIN: 0
BACK PAIN: 0
SHORTNESS OF BREATH: 0
CHEST TIGHTNESS: 0

## 2022-06-30 ENCOUNTER — TELEPHONE (OUTPATIENT)
Dept: OBGYN CLINIC | Age: 62
End: 2022-06-30

## 2022-06-30 NOTE — TELEPHONE ENCOUNTER
[phone] (home) [phone] (work)    Placed call to patient, verbalized understanding. No questions or concerns voiced.
If Dr. Bud Hernández already has her scheduled that soon I would go with him!    Edwin
Patient called and stated she is having surgery with , he wants to proceed with this option. Stated she is still ok with you doing the surgery if you want. But this is what he wants to do.  Stated she is scheduled for the 20th of July at [facility]     Routing to Dr. Kaila Nguyen
no concerns

## 2022-07-06 ENCOUNTER — HOSPITAL ENCOUNTER (OUTPATIENT)
Dept: ULTRASOUND IMAGING | Age: 62
Discharge: HOME | End: 2022-07-06
Payer: MEDICARE

## 2022-07-06 DIAGNOSIS — R92.8 ABNORMAL MAMMOGRAM OF BOTH BREASTS: ICD-10-CM

## 2022-07-06 PROCEDURE — 76641 ULTRASOUND BREAST COMPLETE: CPT

## 2022-07-15 NOTE — PROGRESS NOTES
unforeseen conditions may be revealed that necessitate extension of the original procedure(s) or different procedure(s) than those set forth in Paragraph 1. I (we) authorize and request that the above-named physician, his/her assistants or his/her designees, perform procedures as necessary and desirable if deemed to be in my (our) best interest.     Revised 8/2/2021                                                                          Page 1 of 2       I acknowledge that health care personnel may be observing this procedure for the purpose of medical education or other specified purposes as may be necessary as requested and/or approved by my (our) physician. I (we) consent to the disposal by the hospital Pathologist of the removed tissue, parts or organs in accordance with hospital policy. I do ____ do not ____ consent to the use of a local infiltration pain blocking agent that will be used by my provider/surgical provider to help alleviate pain during my procedure. I do ____ do not ____ consent to an emergent blood transfusion in the case of a life-threatening situation that requires blood components to be administered. This consent is valid for 24 hours from the beginning of the procedure. This patient does ____ or does not ____ currently have a DNR status/order. If DNR order is in place, obtain Addendum to the Surgical Consent for ALL Patients with a DNR Order to address galina-operative status for limited intervention or DNR suspension.      I have read and fully understand the above Consent for Operation/Procedure and that all blanks were completed before I signed the consent.   _____________________________       _____________________      ____/____am/pm  Signature of Patient or legal representative      Printed Name / Relationship            Date / Time   ____________________________       _____________________      ____/____am/pm  Witness to [employer]

## 2022-07-18 RX ORDER — M-VIT,TX,IRON,MINS/CALC/FOLIC 27MG-0.4MG
1 TABLET ORAL DAILY
COMMUNITY

## 2022-07-18 NOTE — PROGRESS NOTES
[facility] PRE-SURGICAL TESTING INSTRUCTIONS                                  PRIOR TO PROCEDURE DATE:        1. PLEASE FOLLOW ANY  GUIDELINES/ INSTRUCTIONS PRIOR TO YOUR PROCEDURE AS ADVISED BY YOUR SURGEON. 2. Arrange for someone to drive you home and be with you for the first 24 hours after discharge for your safety after your procedure for which you received sedation. Ensure it is someone we can share information with regarding your discharge. 3. You must contact your surgeon for instructions IF:  You are taking any blood thinners, aspirin, anti-inflammatory or vitamin E. There is a change in your physical condition such as a cold, fever, rash, cuts, sores or any other infection, especially near your surgical site. 4. Do not drink alcohol the day before or day of your procedure. 5. A Pre-op History and Physical for surgery MUST be completed by your Physician or Urgent Care within 30 days of your procedure date. Please bring a copy with you on the day of your procedure and along with any other testing performed. THE DAY OF YOUR PROCEDURE:  1. Follow instructions for ARRIVAL TIME as DIRECTED BY YOUR SURGEON. 2. Enter the MAIN entrance from [address] and follow the signs to the free [employer] or "[employer] parking (offered free of charge 6am-5pm). 3. Enter the Main Entrance of the hospital (do not enter from the lower level of the parking garage). Upon entrance, check in with the  at the main desk on your left. If no one is available at the desk, proceed into the [facility] Waiting Room and go through the door directly into the [facility]. There is a Check-in desk ACROSS from Room 5 (marked with a sign hanging from the ceiling). The phone number for the surgery center is [phone]. 4. Please call [phone] option #2 option #2 if you have not been preregistered yet.   On the day of your procedure bring your insurance card and photo ID. You will be registered at your bedside once brought back to your room. 5. DO NOT EAT ANYTHING eight hours prior to your arrival for surgery. May have 8 ounces of water 4 hours prior to your arrival for surgery. NOTE: ALL Gastric, Bariatric and Bowel surgery patients MUST follow their surgeon's instructions. 6. MEDICATIONS   Take the following medications with a SMALL sip of water: LEVOTHYROXINE  Bariatric patient's call surgeon if on diabetic medications as some need to be stopped 1 week preop  Use your usual dose of inhalers the morning of surgery. BRING your rescue inhaler with you to hospital.   Anesthesia does NOT want you to take insulin the morning of surgery. They will control your blood sugar while you are at the hospital. Please contact your ordering physician for instructions regarding your insulin the night before your procedure. If you have an insulin pump, please keep it set on basal rate. 7. Do not swallow water when brushing teeth. No gum, candy, mints or ice chips. Refrain from smoking or at least decrease the amount. 8. Dress in loose, comfortable clothing appropriate for redressing after your procedure. Do not wear jewelry (including body piercings), make-up (especially NO eye make-up), fingernail polish (NO toenail polish if foot/leg surgery), lotion, powders or metal hairclips. 9. Dentures, glasses, or contacts will need to be removed before your procedure. Bring cases for your glasses, contacts, dentures, or hearing aids to protect them while you are in surgery. 10. If you use a CPAP, please bring it with you on the day of your procedure. 11. We recommend that valuable personal  belongings such as cash, cell phones, e-tablets or jewelry, be left at home during your stay. The hospital will not be responsible for valuables that are not secured in the hospital safe.  However, if your insurance requires a co-pay, you may want to bring a method of payment, i.e. Check or credit card, if you wish to pay your co-pay the day of surgery. 12. If you are to stay overnight, you may bring a bag with personal items. Please have any large items you may need brought in by your family after your arrival to your hospital room. 15. If you have a Living Will or Durable Power of , please bring a copy on the day of your procedure. 15. With your permission, one family member may accompany you while you are being prepared for surgery. Once you are ready, additional family members may join you. HOW WE KEEP YOU SAFE and WORK TO PREVENT SURGICAL SITE INFECTIONS:  1. Health care workers should always check your ID bracelet to verify your name and birth date. You will be asked many times to state your name, date of birth, and allergies. 2. Health care workers should always clean their hands with soap or alcohol gel before providing care to you. It is okay to ask anyone if they cleaned their hands before they touch you. 3. You will be actively involved in verifying the type of procedure you are having and ensuring the correct surgical site. This will be confirmed multiple times prior to your procedure. Do NOT chetan your surgery site UNLESS instructed to by your surgeon. 4. Do not shave or wax for 72 hours prior to procedure near your operative site. Shaving with a razor can irritate your skin and make it easier to develop an infection. On the day of your procedure, any hair that needs to be removed near the surgical site will be clipped by a healthcare worker using a special clippers designed to avoid skin irritation. 5. When you are in the operating room, your surgical site will be cleansed with a special soap, and in most cases, you will be given an antibiotic before the surgery begins. What to expect AFTER YOUR PROCEDURE:  1. Immediately following your procedure, your will be taken to the PACU for the first phase of your recovery.   Your nurse will help you recover from any potential side effects of anesthesia, such as extreme drowsiness, changes in your vital signs or breathing patterns. Nausea, headache, muscle aches, or sore throat may also occur after anesthesia. Your nurse will help you manage these potential side effects. 2. For comfort and safety, arrange to have someone at home with you for the first 24 hours after discharge. 3. You and your family will be given written instructions about your diet, activity, dressing care, medications, and return visits. 4. Once at home, should issues with nausea, pain, or bleeding occur, or should you notice any signs of infection, you should call your surgeon. 5. Always clean your hands before and after caring for your wound. Do not let your family touch your surgery site without cleaning their hands. 6. Narcotic pain medications can cause significant constipation. You may want to add a stool softener to your postoperative medication schedule or speak to your surgeon on how best to manage this SIDE EFFECT. SPECIAL INSTRUCTIONS     Thank you for allowing us to care for you. We strive to exceed your expectations in the delivery of care and service provided to you and your family. If you need to contact the [place] [address] staff for any reason, please call us at [phone]    Instructions reviewed with patient during preadmission testing phone interview.   Tg Heller RN.7/18/2022 .9:17 AM      ADDITIONAL EDUCATIONAL INFORMATION REVIEWED PER PHONE WITH YOU AND/OR YOUR FAMILY:  No Hibiclens® Bathing Instructions   Yes Antibacterial Soap

## 2022-07-20 ENCOUNTER — ANESTHESIA EVENT (OUTPATIENT)
Dept: OPERATING ROOM | Age: 62
End: 2022-07-20
Payer: MEDICARE

## 2022-07-20 ENCOUNTER — HOSPITAL ENCOUNTER (OUTPATIENT)
Age: 62
Setting detail: OBSERVATION
Discharge: HOME | End: 2022-07-21
Attending: OBSTETRICS & GYNECOLOGY | Admitting: OBSTETRICS & GYNECOLOGY
Payer: MEDICARE

## 2022-07-20 ENCOUNTER — ANESTHESIA (OUTPATIENT)
Dept: OPERATING ROOM | Age: 62
End: 2022-07-20
Payer: MEDICARE

## 2022-07-20 DIAGNOSIS — R97.0 ELEVATED CARCINOEMBRYONIC ANTIGEN (CEA): ICD-10-CM

## 2022-07-20 DIAGNOSIS — N94.89 ADNEXAL MASS: ICD-10-CM

## 2022-07-20 DIAGNOSIS — Z90.710 S/P LAPAROSCOPIC HYSTERECTOMY: Primary | ICD-10-CM

## 2022-07-20 LAB
ABO/RH: NORMAL
ANTIBODY SCREEN: NORMAL
GLUCOSE BLD-MCNC: 142 MG/DL (ref 70–99)
GLUCOSE BLD-MCNC: 145 MG/DL (ref 70–99)
GLUCOSE BLD-MCNC: 161 MG/DL (ref 70–99)
GLUCOSE BLD-MCNC: 381 MG/DL (ref 70–99)
PERFORMED ON: ABNORMAL

## 2022-07-20 PROCEDURE — 2580000003 HC RX 258: Performed by: OBSTETRICS & GYNECOLOGY

## 2022-07-20 PROCEDURE — 86850 RBC ANTIBODY SCREEN: CPT

## 2022-07-20 PROCEDURE — 2500000003 HC RX 250 WO HCPCS: Performed by: OBSTETRICS & GYNECOLOGY

## 2022-07-20 PROCEDURE — 88331 PATH CONSLTJ SURG 1 BLK 1SPC: CPT

## 2022-07-20 PROCEDURE — 2709999900 HC NON-CHARGEABLE SUPPLY: Performed by: OBSTETRICS & GYNECOLOGY

## 2022-07-20 PROCEDURE — 7100000001 HC PACU RECOVERY - ADDTL 15 MIN: Performed by: OBSTETRICS & GYNECOLOGY

## 2022-07-20 PROCEDURE — 3600000009 HC SURGERY ROBOT BASE: Performed by: OBSTETRICS & GYNECOLOGY

## 2022-07-20 PROCEDURE — 88307 TISSUE EXAM BY PATHOLOGIST: CPT

## 2022-07-20 PROCEDURE — 86901 BLOOD TYPING SEROLOGIC RH(D): CPT

## 2022-07-20 PROCEDURE — 2580000003 HC RX 258: Performed by: ANESTHESIOLOGY

## 2022-07-20 PROCEDURE — 6360000002 HC RX W HCPCS: Performed by: NURSE ANESTHETIST, CERTIFIED REGISTERED

## 2022-07-20 PROCEDURE — 3700000000 HC ANESTHESIA ATTENDED CARE: Performed by: OBSTETRICS & GYNECOLOGY

## 2022-07-20 PROCEDURE — 6360000002 HC RX W HCPCS: Performed by: OBSTETRICS & GYNECOLOGY

## 2022-07-20 PROCEDURE — 96366 THER/PROPH/DIAG IV INF ADDON: CPT

## 2022-07-20 PROCEDURE — 2500000003 HC RX 250 WO HCPCS: Performed by: NURSE ANESTHETIST, CERTIFIED REGISTERED

## 2022-07-20 PROCEDURE — 6360000002 HC RX W HCPCS: Performed by: ANESTHESIOLOGY

## 2022-07-20 PROCEDURE — 96365 THER/PROPH/DIAG IV INF INIT: CPT

## 2022-07-20 PROCEDURE — 88305 TISSUE EXAM BY PATHOLOGIST: CPT

## 2022-07-20 PROCEDURE — 2580000003 HC RX 258: Performed by: NURSE ANESTHETIST, CERTIFIED REGISTERED

## 2022-07-20 PROCEDURE — 3600000019 HC SURGERY ROBOT ADDTL 15MIN: Performed by: OBSTETRICS & GYNECOLOGY

## 2022-07-20 PROCEDURE — 6370000000 HC RX 637 (ALT 250 FOR IP): Performed by: OBSTETRICS & GYNECOLOGY

## 2022-07-20 PROCEDURE — S2900 ROBOTIC SURGICAL SYSTEM: HCPCS | Performed by: OBSTETRICS & GYNECOLOGY

## 2022-07-20 PROCEDURE — 88112 CYTOPATH CELL ENHANCE TECH: CPT

## 2022-07-20 PROCEDURE — 7100000000 HC PACU RECOVERY - FIRST 15 MIN: Performed by: OBSTETRICS & GYNECOLOGY

## 2022-07-20 PROCEDURE — G0378 HOSPITAL OBSERVATION PER HR: HCPCS

## 2022-07-20 PROCEDURE — 86900 BLOOD TYPING SEROLOGIC ABO: CPT

## 2022-07-20 PROCEDURE — 3700000001 HC ADD 15 MINUTES (ANESTHESIA): Performed by: OBSTETRICS & GYNECOLOGY

## 2022-07-20 RX ORDER — MORPHINE SULFATE 2 MG/ML
2 INJECTION, SOLUTION INTRAMUSCULAR; INTRAVENOUS
Status: DISCONTINUED | OUTPATIENT
Start: 2022-07-20 | End: 2022-07-21 | Stop reason: HOSPADM

## 2022-07-20 RX ORDER — DEXTROSE MONOHYDRATE 100 MG/ML
INJECTION, SOLUTION INTRAVENOUS CONTINUOUS PRN
Status: DISCONTINUED | OUTPATIENT
Start: 2022-07-20 | End: 2022-07-21 | Stop reason: HOSPADM

## 2022-07-20 RX ORDER — FENTANYL CITRATE 50 UG/ML
25 INJECTION, SOLUTION INTRAMUSCULAR; INTRAVENOUS EVERY 5 MIN PRN
Status: DISCONTINUED | OUTPATIENT
Start: 2022-07-20 | End: 2022-07-20 | Stop reason: HOSPADM

## 2022-07-20 RX ORDER — ENOXAPARIN SODIUM 100 MG/ML
40 INJECTION SUBCUTANEOUS DAILY
Status: DISCONTINUED | OUTPATIENT
Start: 2022-07-21 | End: 2022-07-21 | Stop reason: HOSPADM

## 2022-07-20 RX ORDER — ONDANSETRON 4 MG/1
4 TABLET, ORALLY DISINTEGRATING ORAL EVERY 8 HOURS PRN
Status: DISCONTINUED | OUTPATIENT
Start: 2022-07-20 | End: 2022-07-21 | Stop reason: HOSPADM

## 2022-07-20 RX ORDER — SODIUM CHLORIDE, SODIUM LACTATE, POTASSIUM CHLORIDE, AND CALCIUM CHLORIDE .6; .31; .03; .02 G/100ML; G/100ML; G/100ML; G/100ML
IRRIGANT IRRIGATION PRN
Status: DISCONTINUED | OUTPATIENT
Start: 2022-07-20 | End: 2022-07-20 | Stop reason: HOSPADM

## 2022-07-20 RX ORDER — DEXAMETHASONE SODIUM PHOSPHATE 4 MG/ML
INJECTION, SOLUTION INTRA-ARTICULAR; INTRALESIONAL; INTRAMUSCULAR; INTRAVENOUS; SOFT TISSUE PRN
Status: DISCONTINUED | OUTPATIENT
Start: 2022-07-20 | End: 2022-07-20 | Stop reason: SDUPTHER

## 2022-07-20 RX ORDER — OXYCODONE HYDROCHLORIDE 5 MG/1
5 TABLET ORAL EVERY 4 HOURS PRN
Status: DISCONTINUED | OUTPATIENT
Start: 2022-07-20 | End: 2022-07-21 | Stop reason: HOSPADM

## 2022-07-20 RX ORDER — LIDOCAINE HYDROCHLORIDE 10 MG/ML
1 INJECTION, SOLUTION EPIDURAL; INFILTRATION; INTRACAUDAL; PERINEURAL
Status: DISCONTINUED | OUTPATIENT
Start: 2022-07-20 | End: 2022-07-20 | Stop reason: HOSPADM

## 2022-07-20 RX ORDER — MIDAZOLAM HYDROCHLORIDE 1 MG/ML
INJECTION INTRAMUSCULAR; INTRAVENOUS PRN
Status: DISCONTINUED | OUTPATIENT
Start: 2022-07-20 | End: 2022-07-20 | Stop reason: SDUPTHER

## 2022-07-20 RX ORDER — SODIUM CHLORIDE 9 MG/ML
INJECTION, SOLUTION INTRAVENOUS PRN
Status: DISCONTINUED | OUTPATIENT
Start: 2022-07-20 | End: 2022-07-20 | Stop reason: HOSPADM

## 2022-07-20 RX ORDER — MEPERIDINE HYDROCHLORIDE 25 MG/ML
12.5 INJECTION INTRAMUSCULAR; INTRAVENOUS; SUBCUTANEOUS EVERY 5 MIN PRN
Status: DISCONTINUED | OUTPATIENT
Start: 2022-07-20 | End: 2022-07-20 | Stop reason: HOSPADM

## 2022-07-20 RX ORDER — FENTANYL CITRATE 50 UG/ML
INJECTION, SOLUTION INTRAMUSCULAR; INTRAVENOUS PRN
Status: DISCONTINUED | OUTPATIENT
Start: 2022-07-20 | End: 2022-07-20 | Stop reason: SDUPTHER

## 2022-07-20 RX ORDER — SODIUM CHLORIDE 0.9 % (FLUSH) 0.9 %
5-40 SYRINGE (ML) INJECTION EVERY 12 HOURS SCHEDULED
Status: DISCONTINUED | OUTPATIENT
Start: 2022-07-20 | End: 2022-07-20 | Stop reason: HOSPADM

## 2022-07-20 RX ORDER — KETOROLAC TROMETHAMINE 30 MG/ML
INJECTION, SOLUTION INTRAMUSCULAR; INTRAVENOUS PRN
Status: DISCONTINUED | OUTPATIENT
Start: 2022-07-20 | End: 2022-07-20 | Stop reason: SDUPTHER

## 2022-07-20 RX ORDER — DULOXETIN HYDROCHLORIDE 30 MG/1
30 CAPSULE, DELAYED RELEASE ORAL NIGHTLY
Status: DISCONTINUED | OUTPATIENT
Start: 2022-07-20 | End: 2022-07-21 | Stop reason: HOSPADM

## 2022-07-20 RX ORDER — ONDANSETRON 4 MG/1
4 TABLET, FILM COATED ORAL EVERY 8 HOURS PRN
Qty: 20 TABLET | Refills: 0 | Status: SHIPPED | OUTPATIENT
Start: 2022-07-20

## 2022-07-20 RX ORDER — SODIUM CHLORIDE 9 MG/ML
INJECTION, SOLUTION INTRAVENOUS CONTINUOUS
Status: DISCONTINUED | OUTPATIENT
Start: 2022-07-20 | End: 2022-07-21 | Stop reason: HOSPADM

## 2022-07-20 RX ORDER — PROCHLORPERAZINE EDISYLATE 5 MG/ML
5 INJECTION INTRAMUSCULAR; INTRAVENOUS
Status: DISCONTINUED | OUTPATIENT
Start: 2022-07-20 | End: 2022-07-20 | Stop reason: HOSPADM

## 2022-07-20 RX ORDER — FAMOTIDINE 10 MG/ML
INJECTION, SOLUTION INTRAVENOUS PRN
Status: DISCONTINUED | OUTPATIENT
Start: 2022-07-20 | End: 2022-07-20 | Stop reason: SDUPTHER

## 2022-07-20 RX ORDER — SODIUM CHLORIDE 0.9 % (FLUSH) 0.9 %
5-40 SYRINGE (ML) INJECTION PRN
Status: DISCONTINUED | OUTPATIENT
Start: 2022-07-20 | End: 2022-07-20 | Stop reason: HOSPADM

## 2022-07-20 RX ORDER — LISINOPRIL 10 MG/1
10 TABLET ORAL DAILY
Status: DISCONTINUED | OUTPATIENT
Start: 2022-07-20 | End: 2022-07-21 | Stop reason: HOSPADM

## 2022-07-20 RX ORDER — OXYCODONE HYDROCHLORIDE 5 MG/1
10 TABLET ORAL EVERY 4 HOURS PRN
Status: DISCONTINUED | OUTPATIENT
Start: 2022-07-20 | End: 2022-07-21 | Stop reason: HOSPADM

## 2022-07-20 RX ORDER — ATORVASTATIN CALCIUM 40 MG/1
40 TABLET, FILM COATED ORAL NIGHTLY
Status: DISCONTINUED | OUTPATIENT
Start: 2022-07-20 | End: 2022-07-21 | Stop reason: HOSPADM

## 2022-07-20 RX ORDER — ONDANSETRON 2 MG/ML
4 INJECTION INTRAMUSCULAR; INTRAVENOUS
Status: DISCONTINUED | OUTPATIENT
Start: 2022-07-20 | End: 2022-07-20 | Stop reason: HOSPADM

## 2022-07-20 RX ORDER — DOCUSATE SODIUM 100 MG/1
100 CAPSULE, LIQUID FILLED ORAL NIGHTLY
Status: DISCONTINUED | OUTPATIENT
Start: 2022-07-20 | End: 2022-07-21 | Stop reason: HOSPADM

## 2022-07-20 RX ORDER — MAGNESIUM HYDROXIDE/ALUMINUM HYDROXICE/SIMETHICONE 120; 1200; 1200 MG/30ML; MG/30ML; MG/30ML
30 SUSPENSION ORAL EVERY 6 HOURS PRN
Status: DISCONTINUED | OUTPATIENT
Start: 2022-07-20 | End: 2022-07-21 | Stop reason: HOSPADM

## 2022-07-20 RX ORDER — DIPHENHYDRAMINE HYDROCHLORIDE 50 MG/ML
12.5 INJECTION INTRAMUSCULAR; INTRAVENOUS EVERY 6 HOURS PRN
Status: DISCONTINUED | OUTPATIENT
Start: 2022-07-20 | End: 2022-07-21 | Stop reason: HOSPADM

## 2022-07-20 RX ORDER — LABETALOL HYDROCHLORIDE 5 MG/ML
10 INJECTION, SOLUTION INTRAVENOUS
Status: DISCONTINUED | OUTPATIENT
Start: 2022-07-20 | End: 2022-07-20 | Stop reason: HOSPADM

## 2022-07-20 RX ORDER — ROCURONIUM BROMIDE 10 MG/ML
INJECTION, SOLUTION INTRAVENOUS PRN
Status: DISCONTINUED | OUTPATIENT
Start: 2022-07-20 | End: 2022-07-20 | Stop reason: SDUPTHER

## 2022-07-20 RX ORDER — PROPOFOL 10 MG/ML
INJECTION, EMULSION INTRAVENOUS PRN
Status: DISCONTINUED | OUTPATIENT
Start: 2022-07-20 | End: 2022-07-20 | Stop reason: SDUPTHER

## 2022-07-20 RX ORDER — IBUPROFEN 600 MG/1
600 TABLET ORAL EVERY 6 HOURS PRN
Qty: 30 TABLET | Refills: 1 | Status: SHIPPED | OUTPATIENT
Start: 2022-07-20

## 2022-07-20 RX ORDER — LEVOTHYROXINE SODIUM 0.03 MG/1
25 TABLET ORAL DAILY
Status: DISCONTINUED | OUTPATIENT
Start: 2022-07-21 | End: 2022-07-21 | Stop reason: HOSPADM

## 2022-07-20 RX ORDER — ONDANSETRON 2 MG/ML
INJECTION INTRAMUSCULAR; INTRAVENOUS PRN
Status: DISCONTINUED | OUTPATIENT
Start: 2022-07-20 | End: 2022-07-20 | Stop reason: SDUPTHER

## 2022-07-20 RX ORDER — DIPHENHYDRAMINE HYDROCHLORIDE 50 MG/ML
INJECTION INTRAMUSCULAR; INTRAVENOUS PRN
Status: DISCONTINUED | OUTPATIENT
Start: 2022-07-20 | End: 2022-07-20 | Stop reason: SDUPTHER

## 2022-07-20 RX ORDER — METOCLOPRAMIDE HYDROCHLORIDE 5 MG/ML
5 INJECTION INTRAMUSCULAR; INTRAVENOUS EVERY 6 HOURS PRN
Status: DISCONTINUED | OUTPATIENT
Start: 2022-07-20 | End: 2022-07-21 | Stop reason: HOSPADM

## 2022-07-20 RX ORDER — SODIUM CHLORIDE 0.9 % (FLUSH) 0.9 %
5-40 SYRINGE (ML) INJECTION EVERY 12 HOURS SCHEDULED
Status: DISCONTINUED | OUTPATIENT
Start: 2022-07-20 | End: 2022-07-21 | Stop reason: HOSPADM

## 2022-07-20 RX ORDER — MORPHINE SULFATE 2 MG/ML
4 INJECTION, SOLUTION INTRAMUSCULAR; INTRAVENOUS
Status: DISCONTINUED | OUTPATIENT
Start: 2022-07-20 | End: 2022-07-21 | Stop reason: HOSPADM

## 2022-07-20 RX ORDER — ONDANSETRON 2 MG/ML
4 INJECTION INTRAMUSCULAR; INTRAVENOUS EVERY 6 HOURS PRN
Status: DISCONTINUED | OUTPATIENT
Start: 2022-07-20 | End: 2022-07-21 | Stop reason: HOSPADM

## 2022-07-20 RX ORDER — SODIUM CHLORIDE 0.9 % (FLUSH) 0.9 %
5-40 SYRINGE (ML) INJECTION PRN
Status: DISCONTINUED | OUTPATIENT
Start: 2022-07-20 | End: 2022-07-21 | Stop reason: HOSPADM

## 2022-07-20 RX ORDER — HYDRALAZINE HYDROCHLORIDE 20 MG/ML
10 INJECTION INTRAMUSCULAR; INTRAVENOUS
Status: DISCONTINUED | OUTPATIENT
Start: 2022-07-20 | End: 2022-07-20 | Stop reason: HOSPADM

## 2022-07-20 RX ORDER — SODIUM CHLORIDE, SODIUM LACTATE, POTASSIUM CHLORIDE, CALCIUM CHLORIDE 600; 310; 30; 20 MG/100ML; MG/100ML; MG/100ML; MG/100ML
INJECTION, SOLUTION INTRAVENOUS CONTINUOUS
Status: DISCONTINUED | OUTPATIENT
Start: 2022-07-20 | End: 2022-07-20 | Stop reason: HOSPADM

## 2022-07-20 RX ORDER — BUPIVACAINE HYDROCHLORIDE 2.5 MG/ML
INJECTION, SOLUTION EPIDURAL; INFILTRATION; INTRACAUDAL PRN
Status: DISCONTINUED | OUTPATIENT
Start: 2022-07-20 | End: 2022-07-20 | Stop reason: HOSPADM

## 2022-07-20 RX ORDER — OXYCODONE HYDROCHLORIDE 5 MG/1
5 TABLET ORAL EVERY 12 HOURS
Qty: 10 TABLET | Refills: 0 | Status: SHIPPED | OUTPATIENT
Start: 2022-07-20 | End: 2022-07-25

## 2022-07-20 RX ORDER — POLYETHYLENE GLYCOL 3350 17 G/17G
17 POWDER, FOR SOLUTION ORAL DAILY
Status: DISCONTINUED | OUTPATIENT
Start: 2022-07-20 | End: 2022-07-21 | Stop reason: HOSPADM

## 2022-07-20 RX ORDER — DOCUSATE SODIUM 100 MG/1
100 CAPSULE, LIQUID FILLED ORAL 2 TIMES DAILY
Qty: 60 CAPSULE | Refills: 1 | Status: SHIPPED | OUTPATIENT
Start: 2022-07-20

## 2022-07-20 RX ORDER — SODIUM CHLORIDE 9 MG/ML
INJECTION, SOLUTION INTRAVENOUS PRN
Status: DISCONTINUED | OUTPATIENT
Start: 2022-07-20 | End: 2022-07-21 | Stop reason: HOSPADM

## 2022-07-20 RX ORDER — SODIUM CHLORIDE, SODIUM LACTATE, POTASSIUM CHLORIDE, CALCIUM CHLORIDE 600; 310; 30; 20 MG/100ML; MG/100ML; MG/100ML; MG/100ML
INJECTION, SOLUTION INTRAVENOUS CONTINUOUS PRN
Status: DISCONTINUED | OUTPATIENT
Start: 2022-07-20 | End: 2022-07-20 | Stop reason: SDUPTHER

## 2022-07-20 RX ORDER — LABETALOL HYDROCHLORIDE 5 MG/ML
INJECTION, SOLUTION INTRAVENOUS PRN
Status: DISCONTINUED | OUTPATIENT
Start: 2022-07-20 | End: 2022-07-20 | Stop reason: SDUPTHER

## 2022-07-20 RX ADMIN — ROCURONIUM BROMIDE 20 MG: 10 INJECTION, SOLUTION INTRAVENOUS at 12:22

## 2022-07-20 RX ADMIN — FENTANYL CITRATE 50 MCG: 50 INJECTION, SOLUTION INTRAMUSCULAR; INTRAVENOUS at 11:43

## 2022-07-20 RX ADMIN — CEFAZOLIN 2000 MG: 2 INJECTION, POWDER, FOR SOLUTION INTRAMUSCULAR; INTRAVENOUS at 20:19

## 2022-07-20 RX ADMIN — ONDANSETRON 4 MG: 2 INJECTION INTRAMUSCULAR; INTRAVENOUS at 13:11

## 2022-07-20 RX ADMIN — ROCURONIUM BROMIDE 50 MG: 10 INJECTION, SOLUTION INTRAVENOUS at 12:33

## 2022-07-20 RX ADMIN — PROPOFOL 140 MG: 10 INJECTION, EMULSION INTRAVENOUS at 11:31

## 2022-07-20 RX ADMIN — LABETALOL HYDROCHLORIDE 5 MG: 5 INJECTION, SOLUTION INTRAVENOUS at 12:41

## 2022-07-20 RX ADMIN — ONDANSETRON 4 MG: 2 INJECTION INTRAMUSCULAR; INTRAVENOUS at 13:16

## 2022-07-20 RX ADMIN — SODIUM CHLORIDE, POTASSIUM CHLORIDE, SODIUM LACTATE AND CALCIUM CHLORIDE: 600; 310; 30; 20 INJECTION, SOLUTION INTRAVENOUS at 09:01

## 2022-07-20 RX ADMIN — SODIUM CHLORIDE: 9 INJECTION, SOLUTION INTRAVENOUS at 16:59

## 2022-07-20 RX ADMIN — FENTANYL CITRATE 50 MCG: 50 INJECTION, SOLUTION INTRAMUSCULAR; INTRAVENOUS at 12:24

## 2022-07-20 RX ADMIN — DULOXETINE HYDROCHLORIDE 30 MG: 30 CAPSULE, DELAYED RELEASE ORAL at 20:20

## 2022-07-20 RX ADMIN — ROCURONIUM BROMIDE 50 MG: 10 INJECTION, SOLUTION INTRAVENOUS at 11:31

## 2022-07-20 RX ADMIN — Medication 80 MG: at 11:31

## 2022-07-20 RX ADMIN — CEFAZOLIN 2000 MG: 2 INJECTION, POWDER, FOR SOLUTION INTRAMUSCULAR; INTRAVENOUS at 11:31

## 2022-07-20 RX ADMIN — DEXAMETHASONE SODIUM PHOSPHATE 8 MG: 4 INJECTION, SOLUTION INTRAMUSCULAR; INTRAVENOUS at 11:48

## 2022-07-20 RX ADMIN — LABETALOL HYDROCHLORIDE 5 MG: 5 INJECTION, SOLUTION INTRAVENOUS at 13:00

## 2022-07-20 RX ADMIN — SUGAMMADEX 50 MG: 100 INJECTION, SOLUTION INTRAVENOUS at 13:19

## 2022-07-20 RX ADMIN — ATORVASTATIN CALCIUM 40 MG: 40 TABLET, FILM COATED ORAL at 20:20

## 2022-07-20 RX ADMIN — FAMOTIDINE 20 MG: 10 INJECTION, SOLUTION INTRAVENOUS at 13:38

## 2022-07-20 RX ADMIN — SUGAMMADEX 100 MG: 100 INJECTION, SOLUTION INTRAVENOUS at 13:17

## 2022-07-20 RX ADMIN — ROCURONIUM BROMIDE 20 MG: 10 INJECTION, SOLUTION INTRAVENOUS at 12:05

## 2022-07-20 RX ADMIN — KETOROLAC TROMETHAMINE 30 MG: 30 INJECTION, SOLUTION INTRAMUSCULAR at 11:48

## 2022-07-20 RX ADMIN — MIDAZOLAM HYDROCHLORIDE 2 MG: 2 INJECTION, SOLUTION INTRAMUSCULAR; INTRAVENOUS at 11:24

## 2022-07-20 RX ADMIN — SODIUM CHLORIDE, SODIUM LACTATE, POTASSIUM CHLORIDE, AND CALCIUM CHLORIDE: .6; .31; .03; .02 INJECTION, SOLUTION INTRAVENOUS at 12:34

## 2022-07-20 RX ADMIN — INSULIN GLARGINE 50 UNITS: 100 INJECTION, SOLUTION SUBCUTANEOUS at 20:21

## 2022-07-20 RX ADMIN — MEPERIDINE HYDROCHLORIDE 12.5 MG: 25 INJECTION INTRAMUSCULAR; INTRAVENOUS; SUBCUTANEOUS at 13:50

## 2022-07-20 RX ADMIN — FENTANYL CITRATE 50 MCG: 50 INJECTION, SOLUTION INTRAMUSCULAR; INTRAVENOUS at 11:31

## 2022-07-20 RX ADMIN — SUGAMMADEX 50 MG: 100 INJECTION, SOLUTION INTRAVENOUS at 13:16

## 2022-07-20 RX ADMIN — DIPHENHYDRAMINE HYDROCHLORIDE 25 MG: 50 INJECTION, SOLUTION INTRAMUSCULAR; INTRAVENOUS at 13:09

## 2022-07-20 RX ADMIN — FENTANYL CITRATE 50 MCG: 50 INJECTION, SOLUTION INTRAMUSCULAR; INTRAVENOUS at 12:34

## 2022-07-20 ASSESSMENT — LIFESTYLE VARIABLES: SMOKING_STATUS: 1

## 2022-07-20 ASSESSMENT — PAIN SCALES - GENERAL
PAINLEVEL_OUTOF10: 0

## 2022-07-20 ASSESSMENT — PAIN - FUNCTIONAL ASSESSMENT: PAIN_FUNCTIONAL_ASSESSMENT: 0-10

## 2022-07-20 NOTE — ANESTHESIA POSTPROCEDURE EVALUATION
Department of Anesthesiology  Postprocedure Note    Patient: Denny Brown  MRN: [id number]  YOB: 1960  Date of evaluation: 7/20/2022      Procedure Summary     Date: 07/20/22 Room / Location: [address] [place] [address]    Anesthesia Start: 5254 Anesthesia Stop:     Procedure: ROBOTIC ASSISTED LAPAROSCOPY,  TOTAL HYSTERECTOMY, BILATERAL SALPINGO-OOPHORECTOMY (Bilateral) Diagnosis:       Adnexal mass      Elevated carcinoembryonic antigen (CEA)      (Adnexal mass [N94.89])      (Elevated carcinoembryonic antigen (CEA) [R97.0])    Surgeons: Jeffrey Mcgowan DO Responsible Provider: Jay Lewis DO    Anesthesia Type: general ASA Status: 3          Anesthesia Type: No value filed. Christa Phase I: Christa Score: 10    Christa Phase II:        Anesthesia Post Evaluation    Patient location during evaluation: PACU  Patient participation: waiting for patient participation  Level of consciousness: lethargic  Pain score: 1  Airway patency: patent  Nausea & Vomiting: no vomiting and no nausea  Complications: no  Cardiovascular status: hemodynamically stable  Respiratory status: acceptable  Hydration status: stable  Comments: Right IV infiltrated. Compression placed on site. No complications.   Multimodal analgesia pain management approach

## 2022-07-20 NOTE — OP NOTE
GYNECOLOGIC ONCOLOGY NOTE    Operative Note      Patient: Kevin Ibarra  YOB: 1960  MRN: [id number]    Date of Procedure: 7/20/2022    Pre-Op Diagnosis: Adnexal mass [N94.89]  Elevated carcinoembryonic antigen (CEA) [R97.0]    Post-Op Diagnosis: Same       Procedure(s):  ROBOTIC ASSISTED LAPAROSCOPY,  TOTAL HYSTERECTOMY, BILATERAL SALPINGO-OOPHORECTOMY    Surgeon(s):  Sterling Reid DO    Assistant:   Surgical Assistant: Tricia Mosher Holter    Anesthesia: General    Estimated Blood Loss (mL): less than 50     Complications: None    Specimens:   ID Type Source Tests Collected by Time Destination   1 : PELVIC WASHINGS Body Fluid Fluid CYTOLOGY, NON-GYN Rodrigue Crenshaw,  7/20/2022 1213    A : UTERUS, CERVIX, FALLOPIAN TUBES, AND OVARIES Tissue Tissue SURGICAL PATHOLOGY Sterling Rook,  7/20/2022 1239        Implants:  * No implants in log *      Drains:   Urinary Catheter (Active)       [REMOVED] Closed/Suction Drain Lateral;Right RLQ Bulb 15 Korean (Removed)       Findings:   Enlarged 5 cm left ovary with multiloculated cyst consistent with serous cystadenoma versus serous cystadenoma fibroma on frozen. No obvious signs of malignancy. No features consistent with borderline malignancy. Right ovary normal in nature  Bilateral fallopian tubes normal in nature  Uterus with small leiomyomas as well as a small benign endometrial polyp  No signs of ascites. No carcinomatosis. Normal normal upper abdominal survey  Mild pelvic adhesions    Detailed Description of Procedure:   After assuring informed consent the patient was taken back to the operating suite and induction of general anesthesia was done. The patient was placed in the modified dorsal lithotomy position in [address] [place] [address] then prepped and draped in the normal sterile fashion. The weighed speculum was placed in the patients vagina and the cervix was grasped at the 12 Oclock position with a single tooth tenaculum.   The uterus was sounded to 6 cm and the cervix was dilated with the oscar dilators up to a number 15. A small V-Care uterine manipulator was placed inside the uterine cavity. Pelayo catheter was placed. Gloves were changed and attention was then turned to the abdomen. A supraumbilical skin incision was then made approximately 22 cm above the pubic symphysis and a veress needle was introduced into this incision. Once proper placement was noted by physical examination and sterile water flowing easily into this incision then CO2 gas was insufflated into the abdomen at a maximum pressure of 15 mmHg. A 5mm laparoscope was inserted under direct visualization. Placement was atraumatic. An abdominal pelvic survey was carried out. Findings were as discussed above. No obvious trochar injury noted. Rest of survey as described above. Bilateral blunt robotic trocars were then placed 8 cm lateral from the midline camera port. An additional left flank robotic port was placed 8 cm lateral from the prior robotic port. The 5 mm laparoscope was then exchanged for an 8 mm robotic trocar. The robotic camera was then inserted. A right upper quadrant 5 mm accessory port was then placed under direct visualization. Peritoneal washings were obtained. The patient was then placed in steep trendelenberg position and the robot was docked. The monopolar scissors were placed in the right robotic arm; bipolar fenestrated graspers in the left robotic arm and the prograsp was placed in the third robotic arm. Bilateral round ligaments were taken down with monopolar cautery and the retroperitoneal space was opened. The underlying ureters were visualized and the infundibulopelvic ligaments bilaterally were skeletonized, clamped, cauterized with bipolar cautery and cut with monopolar cautery.   Dissection was then carried down the broad ligament down to the vesicouterine peritoneum which was incised and the bladder was dissected off the lower uterine segment and cervix. The uterine vessels were then cauterized bilaterally with the bipolar and transected with the monopolar cautery. The cardinal and uterosacral ligaments were then cauterized with the bipolar and transected with the monopolar cautery. A posterior colpotomy was made at the level of the Vcare and brought around circumferentially freeing up the specimen, which was brought out through the vagina. The specimen was then sent to pathology. Findings demonstrated benign uterine leiomyoma as well as benign endometrial polyp. Left ovary contained multiloculated cyst consistent with a benign serous cystadenoma versus serous cystadenofibroma. Right ovary was benign-appearing in nature. No further staging was therefore performed. The vaginal cuff was then reapproximated with 0 PDS in a running fashion, starting from either end and tying in the middle. The pelvis was then irrigated and the vaginal cuff was noted to be hemostatic. The infundibulopelvic ligaments were noted to be hemostatic as well. The instruments were then removed and the robot was undocked. The pneumoperitoneum was then evacuated and all trocars were removed. All skin incisions were closed with a 4-0 vicryl subcuticular stitch and skin glue. The vagina was examined with sponge sticks x 2 and noted to be hemostatic. Sponge, lap, needle and instrument counts were correct x 2. The patient was taken to the recovery room in awake and stable condition. Findings were discussed with her sister immediately postoperatively.     Electronically signed by Baldwin Leyden, DO on 7/20/2022 at 1:54 PM

## 2022-07-20 NOTE — DISCHARGE INSTRUCTIONS
Laparoscopic Hysterectomy: What to Expect at [facility] 1808 laparoscopic hysterectomy is surgery to take out the uterus. Your doctor put a lighted tube and surgical tools through small cuts in your belly to removethe uterus. You can expect to feel better and stronger each day. But you might need pain medicine for a week or two. You may get tired easily or have less energy than usual. The tiredness may last for several weeks after surgery. And you also mayhave light vaginal bleeding for a few weeks. It's important to avoid lifting while you are recovering so that you can heal. It may take about 4 to 6 weeks to fully recover. The recovery time may beshorter for some people. This care sheet gives you a general idea about how long it will take for you to recover. But each person recovers at a different pace. Follow the steps belowto get better as quickly as possible. How can you care for yourself at home? Activity    Rest when you feel tired. Getting enough sleep will help you recover. Try to walk each day. Start by walking a little more than you did the day before. Bit by bit, increase the amount you walk. Walking boosts blood flow and helps prevent pneumonia and constipation. Avoid lifting anything that would make you strain. This may include heavy grocery bags and milk containers, a heavy briefcase or backpack, bags of cat litter or dog food, a vacuum , or a child. Avoid strenuous activities, such as biking, jogging, weight lifting, or aerobic exercise, until your doctor says it is okay. Ask your doctor when you can drive again. You may shower 24 to 48 hours after surgery, if your doctor okays it. Pat the incision dry. Do not take a bath for the first 2 weeks, or until your doctor tells you it is okay. Ask your doctor when it is okay for you to have sex. Diet    You can eat your normal diet.  If your stomach is upset, try bland, low-fat foods like plain rice, broiled chicken, toast, and yogurt. If your bowel movements are not regular right after surgery, try to avoid constipation and straining. Drink plenty of water. Your doctor may suggest fiber, a stool softener, or a mild laxative. Medicines    Your doctor will tell you if and when you can restart your medicines. You will also get instructions about taking any new medicines. If you take aspirin or some other blood thinner, ask your doctor if and when to start taking it again. Make sure that you understand exactly what your doctor wants you to do. Be safe with medicines. Read and follow all instructions on the label. If the doctor gave you a prescription medicine for pain, take it as prescribed. If you are not taking a prescription pain medicine, ask your doctor if you can take an over-the-counter medicine. If your doctor prescribed antibiotics, take them as directed. Do not stop taking them just because you feel better. You need to take the full course of antibiotics. Incision care    You may have stitches over the cuts (incisions) the doctor made in your belly. If you have strips of tape on the cut (incision) the doctor made, leave the tape on for a week or until it falls off. Wash the area daily with warm, soapy water, and pat it dry. Don't use hydrogen peroxide or alcohol. They can slow healing. You may cover the area with a gauze bandage if it oozes fluid or rubs against clothing. Change the bandage every day. Other instructions    You may have some light vaginal bleeding. Wear sanitary pads if needed. Do not douche or use tampons. Follow-up care is a key part of your treatment and safety. Be sure to make and go to all appointments, and call your doctor if you are having problems. It's also a good idea to know your test results and keep alist of the medicines you take. When should you call for help? Call 911 anytime you think you may need emergency care.  For example, call if:    You passed out (lost consciousness). You have chest pain, are short of breath, or cough up blood. Call your doctor now or seek immediate medical care if:    You have pain that does not get better after you take pain medicine. You cannot pass stools or gas. You have vaginal discharge that has increased in amount or smells bad. You are sick to your stomach or cannot drink fluids. You have loose stitches, or your incision comes open. Bright red blood has soaked through the bandage over your incision. You have signs of infection, such as: Increased pain, swelling, warmth, or redness. Red streaks leading from the incision. Pus draining from the incision. A fever. You have severe vaginal bleeding. This means that you are soaking through your usual pads every hour for 2 or more hours. You have signs of a blood clot in your leg (called a deep vein thrombosis), such as:  Pain in your calf, back of the knee, thigh, or groin. Redness and swelling in your leg or groin. Watch closely for changes in your health, and be sure to contact your doctor ifyou have any problems. Where can you learn more? Go to [email or web address]. org and sign in to your [employer] account. Enter Q131 in the [facility] box to learn more about \"Laparoscopic Hysterectomy: What to Expect at Home. \"     If you do not have an account, please click on the \"Sign Up Now\" link. Current as of: November 22, 2021               Content Version: 13.3  © 2006-2022 [employer]. Care instructions adapted under license by [facility]. If you have questions about a medical condition or this instruction, always ask your healthcare professional. [place] [address] any warranty or liability for your use of this information.

## 2022-07-20 NOTE — ANESTHESIA PRE PROCEDURE
Department of Anesthesiology  Preprocedure Note       Name:  Shuanna Matos   Age:  57 y.o.  :  1960                                          MRN:  [id number]         Date:  2022      Surgeon: Samra Muniz):  Nat Zaragoza DO    Procedure: Procedure(s):  ROBOTIC ASSISTED LAPAROSCOPY, RESECTION OF THE PELVIC MASS, TOTAL HYSTERECTOMY, BILATERAL SALPINGO-OOPHORECTOMY, POSSIBLE PELVIC AND PARA-AORTIC LYMPHADENECTOMY, POSSIBLE OMENTECTOMY, POSSIBLE LAPAROTOMY, POSSIBLE STAGING    Medications prior to admission:   Prior to Admission medications    Medication Sig Start Date End Date Taking? Authorizing Provider   Multiple Vitamins-Minerals (THERAPEUTIC MULTIVITAMIN-MINERALS) tablet Take 1 tablet by mouth in the morning.     Historical Provider, MD   blood glucose test strips (ONETOUCH ULTRA) strip TEST BLOOD SUGAR TWICE DAILY AS DIRECTED 22tis Began, APRN - CNP   dapagliflozin (FARXIGA) 10 MG tablet TAKE 1 TABLET BY MOUTH EVERY MORNING 21tis Began, APRN - CNP   Insulin Glargine, 2 Unit Dial, (TOUJEO MAX SOLOSTAR) 300 UNIT/ML SOPN Inject 50 Units into the skin nightly 21 Began, APRN - CNP   Semaglutide,0.25 or 0.5MG/DOS, (OZEMPIC, 0.25 OR 0.5 MG/DOSE,) 2 MG/1.5ML SOPN INJECT 0.5MG INTO THE SKIN ONCE WEEKLY 21tis Began, APRN - CNP   lisinopril (PRINIVIL;ZESTRIL) 10 MG tablet Take 10 mg by mouth daily  21   Historical Provider, MD   Insulin Pen Needle 32G X 4 MM MISC 1 each by Does not apply route daily 20   Jose Alejandro Alvarado MD   Insulin Pen Needle (B-D UF III MINI PEN NEEDLES) 31G X 5 MM MISC Use for toujeo 3 times daily plus with ozempic once a week 20tis Began, APRN - CNP   Semaglutide,0.25 or 0.5MG/DOS, (OZEMPIC, 0.25 OR 0.5 MG/DOSE,) 2 MG/1.5ML SOPN Inject 0.5MG into skin once weekly 10/5/20   Vertis Began, APRN - CNP   levothyroxine (SYNTHROID) 25 MCG tablet Take 1 tablet by mouth Daily 3/20/19   Phyllis Began, APRN - CNP   DULoxetine (Meme Race) Refractive error / Presbyopic Correction Counseling: 30 MG extended release capsule Take 30 mg by mouth nightly     Historical Provider, MD   atorvastatin (LIPITOR) 40 MG tablet Take 40 mg by mouth nightly     Historical Provider, MD   loratadine (CLARITIN) 10 MG capsule Take 10 mg by mouth daily    Historical Provider, MD   docusate sodium (COLACE) 100 MG capsule Take 100 mg by mouth nightly     Historical Provider, MD       Current medications:    No current facility-administered medications for this visit. No current outpatient medications on file. Facility-Administered Medications Ordered in Other Visits   Medication Dose Route Frequency Provider Last Rate Last Admin    ceFAZolin (ANCEF) 2,000 mg in sodium chloride 0.9 % 50 mL IVPB (mini-bag)  2,000 mg IntraVENous Once Rodrigue DO Den        lidocaine PF 1 % injection 1 mL  1 mL IntraDERmal Once PRN Amanda Pascal MD        lactated ringers infusion   IntraVENous Continuous Amanda Pascal MD        sodium chloride flush 0.9 % injection 5-40 mL  5-40 mL IntraVENous 2 times per day Amanda Pascal MD        sodium chloride flush 0.9 % injection 5-40 mL  5-40 mL IntraVENous PRN Amanda Pascal MD        0.9 % sodium chloride infusion   IntraVENous PRN Amanda Pascal MD           Allergies:     Allergies   Allergen Reactions    Metformin And Related Diarrhea    Travoprost      Eye pain, deep red       Problem List:    Patient Active Problem List   Diagnosis Code    Back arthralgia M54.9    Hypothyroidism due to Hashimoto's thyroiditis E03.8, E06.3    Diabetes mellitus type 2 in obese ([facility]) E11.69, E66.9    Class 2 obesity in adult E66.9    Right renal mass N28.89       Past Medical History:        Diagnosis Date    Allergic rhinitis     Arthritis     [facility]     Cancer ([facility]     RENAL    Depression     Diabetes mellitus type 2 in nonobese ([facility]     High blood pressure     Hyperlipidemia     Hypothyroidism     Neoplasm of right kidney        Past Surgical History: Procedure Laterality Date    ANKLE FRACTURE SURGERY      BACK SURGERY      \"mid-back\" @     CATARACT REMOVAL WITH IMPLANT Bilateral     DILATION AND CURETTAGE OF UTERUS      EYE SURGERY      CATARACT    KNEE SURGERY Right     PARTIAL NEPHRECTOMY Right 10/12/2021    DAVINCI RIGHT PARTIAL NEPHRECTOMY WITH ULTRASONIC PROBE performed by Nellie Maya MD at [place] [address].         Social History:    Social History     Tobacco Use    Smoking status: Every Day     Packs/day: 1.00     Years: 44.00     Pack years: 44.00     Types: Cigarettes    Smokeless tobacco: Never   Substance Use Topics    Alcohol use: Never                                Ready to quit: Not Answered  Counseling given: Not Answered      Vital Signs (Current): There were no vitals filed for this visit.                                            BP Readings from Last 3 Encounters:   07/20/22 136/78   06/17/22 110/70   10/14/21 132/76       NPO Status:                                                                                 BMI:   Wt Readings from Last 3 Encounters:   07/20/22 181 lb 12.8 oz (82.5 kg)   06/21/22 181 lb (82.1 kg)   06/17/22 181 lb 3.2 oz (82.2 kg)     There is no height or weight on file to calculate BMI.    CBC:   Lab Results   Component Value Date/Time    WBC 7.5 06/17/2022 09:57 AM    RBC 4.86 06/17/2022 09:57 AM    HGB 15.3 06/17/2022 09:57 AM    HCT 45.5 06/17/2022 09:57 AM    MCV 93.7 06/17/2022 09:57 AM    RDW 13.5 06/17/2022 09:57 AM     06/17/2022 09:57 AM       CMP:   Lab Results   Component Value Date/Time     06/17/2022 09:57 AM    K 5.1 06/17/2022 09:57 AM    K 4.8 10/13/2021 06:13 AM     06/17/2022 09:57 AM    CO2 21 06/17/2022 09:57 AM    BUN 17 06/17/2022 09:57 AM    CREATININE 1.0 06/17/2022 09:57 AM    GFRAA >60 06/17/2022 09:57 AM    GFRAA >60 07/15/2010 10:40 AM    AGRATIO 2.0 06/17/2022 09:57 AM    LABGLOM 56 06/17/2022 09:57 AM    GLUCOSE 124 06/17/2022 09:57 AM PROT 7.1 06/17/2022 09:57 AM    PROT 7.0 07/15/2010 10:40 AM    CALCIUM 9.8 06/17/2022 09:57 AM    BILITOT 0.3 06/17/2022 09:57 AM    ALKPHOS 74 06/17/2022 09:57 AM    AST 22 06/17/2022 09:57 AM    ALT 29 06/17/2022 09:57 AM       POC Tests: No results for input(s): POCGLU, POCNA, POCK, POCCL, POCBUN, POCHEMO, POCHCT in the last 72 hours. Coags: No results found for: PROTIME, INR, APTT    HCG (If Applicable): No results found for: PREGTESTUR, PREGSERUM, HCG, HCGQUANT     ABGs: No results found for: PHART, PO2ART, IEZ8TDS, TQG9MIM, BEART, H5LYTGOG     Type & Screen (If Applicable):  No results found for: LABABO, LABRH    Drug/Infectious Status (If Applicable):  No results found for: HIV, HEPCAB    COVID-19 Screening (If Applicable):   Lab Results   Component Value Date/Time    COVID19 Not Detected 10/07/2021 09:07 AM           Anesthesia Evaluation  Patient summary reviewed and Nursing notes reviewed no history of anesthetic complications:   Airway: Mallampati: I  TM distance: >3 FB   Neck ROM: full  Mouth opening: > = 3 FB   Dental: normal exam   (+) bridge      Pulmonary:Negative Pulmonary ROS and normal exam    (+) current smoker          Patient smoked on day of surgery. Cardiovascular:  Exercise tolerance: good (>4 METS),   (+) hypertension:, hyperlipidemia                  Neuro/Psych:   Negative Neuro/Psych ROS  (+) psychiatric history: stable with treatmentdepression/anxiety             GI/Hepatic/Renal:   (+) morbid obesity     (-) hiatal hernia and GERD       Endo/Other: Negative Endo/Other ROS   (+) DiabetesType II DM, , hypothyroidism: arthritis: OA., malignancy/cancer. Abdominal:       Abdomen: soft. Vascular: negative vascular ROS. Other Findings:             Anesthesia Plan      general     ASA 3     (  )  Induction: intravenous. MIPS: Postoperative opioids intended and Prophylactic antiemetics administered.   Anesthetic plan and risks discussed with patient. Plan discussed with CRNA. Attending anesthesiologist reviewed and agrees with Preprocedure content          Pre-Operative Diagnosis: No admission diagnoses are documented for this encounter. 57 y.o.   BMI:  There is no height or weight on file to calculate BMI. There were no vitals filed for this visit.     Allergies   Allergen Reactions    Metformin And Related Diarrhea    Travoprost      Eye pain, deep red       Social History     Tobacco Use    Smoking status: Every Day     Packs/day: 1.00     Years: 44.00     Pack years: 44.00     Types: Cigarettes    Smokeless tobacco: Never   Substance Use Topics    Alcohol use: Never       LABS:    CBC  Lab Results   Component Value Date/Time    WBC 7.5 06/17/2022 09:57 AM    HGB 15.3 06/17/2022 09:57 AM    HCT 45.5 06/17/2022 09:57 AM     06/17/2022 09:57 AM     RENAL  Lab Results   Component Value Date/Time     06/17/2022 09:57 AM    K 5.1 06/17/2022 09:57 AM    K 4.8 10/13/2021 06:13 AM     06/17/2022 09:57 AM    CO2 21 06/17/2022 09:57 AM    BUN 17 06/17/2022 09:57 AM    CREATININE 1.0 06/17/2022 09:57 AM    GLUCOSE 124 (H) 06/17/2022 09:57 AM     COAGS  No results found for: PROTIME, INR, APTT    Haydee Dean,    7/20/2022

## 2022-07-20 NOTE — PROGRESS NOTES
PACU Transfer to Floor Note    Procedure(s):  ROBOTIC ASSISTED LAPAROSCOPY,  TOTAL HYSTERECTOMY, BILATERAL SALPINGO-OOPHORECTOMY    Current Allergies: Metformin and related and Travoprost    Pt meets criteria as per Benedict Score and ASPAN Standards to transfer to next phase of care. Recent Labs     07/20/22  0905 07/20/22  1347   POCGLU 161* 145*       Vitals:    07/20/22 1520   BP: (!) 157/69   Pulse: 68   Resp:    Temp: (!) 96.6 °F (35.9 °C)   SpO2:      Vitals within 20% of pt's admission vitals as per BENEDICT SCORE    SpO2: 93 %    O2 Flow Rate (L/min): 1 L/min      Intake/Output Summary (Last 24 hours) at 7/20/2022 1522  Last data filed at 7/20/2022 1350  Gross per 24 hour   Intake 954.13 ml   Output 150 ml   Net 804.13 ml       Pain assessment:  none    Pain Level: 0    Patient was assessed for alterations to skin integrity. There were not alterations observed. Is patient incontinent: no. Pt has rodrigues    Handoff report given at bedside.    Family updated and directed to pt room      7/20/2022 3:22 PM

## 2022-07-20 NOTE — PROGRESS NOTES
Pt alert and oriented. IV's started x 2 in bilateral arms. LR infusing in right arm IV. T&S drawn and sent to lab. Ancef to OR.

## 2022-07-20 NOTE — CARE COORDINATION
CM following, pt from home ind, plans to DC home no needs at DC. POD#0 pend Pain/nausea control.   Electronically signed by Sarah Lewis RN on 7/20/2022 at 4:08 PM   [phone]

## 2022-07-20 NOTE — H&P
[address]    [facility] [employer] Same Day Surgery Update H & P  Department of General Surgery   Surgical Service   Pre-operative History and Physical  Last H & P within the last 30 days. DIAGNOSIS:   Adnexal mass [N94.89]  Elevated carcinoembryonic antigen (CEA) [R97.0]    Procedure(s):  ROBOTIC ASSISTED LAPAROSCOPY, RESECTION OF THE PELVIC MASS, TOTAL HYSTERECTOMY, BILATERAL SALPINGO-OOPHORECTOMY, POSSIBLE PELVIC AND PARA-AORTIC LYMPHADENECTOMY, POSSIBLE OMENTECTOMY, POSSIBLE LAPAROTOMY, POSSIBLE STAGING    History obtained from: Patient interview and EHR      HISTORY OF PRESENT ILLNESS:   The patient is a 57 y.o. female S/P right partial nephrectomy was undergoing surveillance scan  when she was alerted to an incidental finding of left adnexal mass. She presents today for the above procedure with Dr. Alexandre Pugh. Illness Screening: Patient denies fever, chills, worsening cough, or close contact with sick individuals. Past Medical History:        Diagnosis Date    Allergic rhinitis     Arthritis     Autoimmune disorder ([address]     Hashimoto's    Bell's palsy     rt sided drooping    Cancer ([address]     RENAL    Depression     Diabetes mellitus type 2 in nonobese (HCC)     High blood pressure     Hyperlipidemia     Hypothyroidism     Neoplasm of right kidney      Past Surgical History:        Procedure Laterality Date    ANKLE FRACTURE SURGERY Right     BACK SURGERY      \"mid-back\" @     CATARACT REMOVAL WITH IMPLANT Bilateral     DILATION AND CURETTAGE OF UTERUS      EYE SURGERY      CATARACT    KNEE SURGERY Right     PARTIAL NEPHRECTOMY Right 10/12/2021    DAVINCI RIGHT PARTIAL NEPHRECTOMY WITH ULTRASONIC PROBE performed by Gian Werner MD at [place] [address]         Medications Prior to Admission:      Prior to Admission medications    Medication Sig Start Date End Date Taking?  Authorizing Provider   Multiple Vitamins-Minerals (THERAPEUTIC MULTIVITAMIN-MINERALS) tablet Take 1 tablet by mouth in the morning.    Yes Historical Provider, MD   blood glucose test strips (ONETOUCH ULTRA) strip TEST BLOOD SUGAR TWICE DAILY AS DIRECTED 2/2/22   Max Dy, APRN - CNP   dapagliflozin (FARXIGA) 10 MG tablet TAKE 1 TABLET BY MOUTH EVERY MORNING 11/11/21   Max Dy, APRN - CNP   Insulin Glargine, 2 Unit Dial, (TOUJEO MAX SOLOSTAR) 300 UNIT/ML SOPN Inject 50 Units into the skin nightly 11/11/21   Max Dy, APRN - CNP   Semaglutide,0.25 or 0.5MG/DOS, (OZEMPIC, 0.25 OR 0.5 MG/DOSE,) 2 MG/1.5ML SOPN INJECT 0.5MG INTO THE SKIN ONCE WEEKLY 11/11/21   Max Dy, APRN - CNP   lisinopril (PRINIVIL;ZESTRIL) 10 MG tablet Take 10 mg by mouth daily  2/7/21   Historical Provider, MD   Insulin Pen Needle 32G X 4 MM MISC 1 each by Does not apply route daily 12/28/20   Vincent Aguilar MD   Insulin Pen Needle (B-D UF III MINI PEN NEEDLES) 31G X 5 MM MISC Use for toujeo 3 times daily plus with ozempic once a week 12/25/20   Max Dy, APRN - CNP   Semaglutide,0.25 or 0.5MG/DOS, (OZEMPIC, 0.25 OR 0.5 MG/DOSE,) 2 MG/1.5ML SOPN Inject 0.5MG into skin once weekly 10/5/20   Max Dy, APRN - CNP   levothyroxine (SYNTHROID) 25 MCG tablet Take 1 tablet by mouth Daily 3/20/19   Max Dy, APRN - CNP   DULoxetine (CYMBALTA) 30 MG extended release capsule Take 30 mg by mouth nightly     Historical Provider, MD   atorvastatin (LIPITOR) 40 MG tablet Take 40 mg by mouth nightly     Historical Provider, MD   loratadine (CLARITIN) 10 MG capsule Take 10 mg by mouth daily    Historical Provider, MD   docusate sodium (COLACE) 100 MG capsule Take 100 mg by mouth nightly     Historical Provider, MD         Allergies:  Metformin and related and Travoprost    PHYSICAL EXAM:      /78   Pulse 96   Temp 97.3 °F (36.3 °C) (Temporal)   Resp 14   Ht 5' 3\" (1.6 m)   Wt 181 lb 12.8 oz (82.5 kg)   SpO2 96%   BMI 32.20 kg/m²      Airway:  Airway patent with no audible stridor    Heart:  Regular rate and rhythm, No murmur

## 2022-07-20 NOTE — PROGRESS NOTES
Pt and her sister Hillary Giraldo) informed of assigned room #. Floor RN notified to come to bedside for report when she has a free moment. Pt denies pain and nausea. Demerol administered earlier for shivering.

## 2022-07-20 NOTE — PROGRESS NOTES
Per [facility] approved formulary policy. SGLT2's are only formulary with the indication of CKD or CHF therefore:    Please note that the  Empagliflozin Turner Pandy) is non-formulary with indications of type 2 diabetes and has been discontinued while inpatient. If you feel the patient needs to continue their home therapy during the inpatient stay, the patient may bring their medication bottle for verification and administration pursuant to our home medication use policy. Please contact the pharmacy with any questions or concerns. Thank you.   Krystian Duque, [facility]

## 2022-07-20 NOTE — PROGRESS NOTES
Procedure(s):  ROBOTIC ASSISTED LAPAROSCOPY,  TOTAL HYSTERECTOMY, BILATERAL SALPINGO-OOPHORECTOMY    Current Allergies: Metformin and related and Travoprost    Recent Labs     07/20/22  0905 07/20/22  1347   POCGLU 161* 145*       Admitted to PACU bed 12 from OR. Arrived on a hospital bed . Attached to PACU monitoring system. Alarms and parameters set. Report received from anesthesia personnel. OR staff did report skin issues that were observed while in OR.    ** Per OR staff, pt had a bruise/fragile area on her anterior LFA by her AC. During surgery, she developed a skin tear over that area of fragile skin/bruise. ACE wrap and guaze applied per OR staff**    Pt arrived with oxygen per nasal cannula with oxygen at 2 liters. Athrombic wraps in place.

## 2022-07-21 VITALS
OXYGEN SATURATION: 97 % | WEIGHT: 181.8 LBS | HEART RATE: 60 BPM | TEMPERATURE: 97.7 F | SYSTOLIC BLOOD PRESSURE: 133 MMHG | RESPIRATION RATE: 17 BRPM | HEIGHT: 63 IN | BODY MASS INDEX: 32.21 KG/M2 | DIASTOLIC BLOOD PRESSURE: 79 MMHG

## 2022-07-21 LAB
ANION GAP SERPL CALCULATED.3IONS-SCNC: 9 MMOL/L (ref 3–16)
BUN BLDV-MCNC: 16 MG/DL (ref 7–20)
CALCIUM SERPL-MCNC: 8.8 MG/DL (ref 8.3–10.6)
CHLORIDE BLD-SCNC: 103 MMOL/L (ref 99–110)
CO2: 23 MMOL/L (ref 21–32)
CREAT SERPL-MCNC: 0.9 MG/DL (ref 0.6–1.2)
GFR AFRICAN AMERICAN: >60
GFR NON-AFRICAN AMERICAN: >60
GLUCOSE BLD-MCNC: 158 MG/DL (ref 70–99)
GLUCOSE BLD-MCNC: 164 MG/DL (ref 70–99)
HCT VFR BLD CALC: 40.2 % (ref 36–48)
HEMOGLOBIN: 13.4 G/DL (ref 12–16)
PERFORMED ON: ABNORMAL
POTASSIUM REFLEX MAGNESIUM: 4.9 MMOL/L (ref 3.5–5.1)
SODIUM BLD-SCNC: 135 MMOL/L (ref 136–145)

## 2022-07-21 PROCEDURE — 6360000002 HC RX W HCPCS: Performed by: OBSTETRICS & GYNECOLOGY

## 2022-07-21 PROCEDURE — 85014 HEMATOCRIT: CPT

## 2022-07-21 PROCEDURE — 85018 HEMOGLOBIN: CPT

## 2022-07-21 PROCEDURE — 96366 THER/PROPH/DIAG IV INF ADDON: CPT

## 2022-07-21 PROCEDURE — 6370000000 HC RX 637 (ALT 250 FOR IP): Performed by: OBSTETRICS & GYNECOLOGY

## 2022-07-21 PROCEDURE — 96372 THER/PROPH/DIAG INJ SC/IM: CPT

## 2022-07-21 PROCEDURE — 2580000003 HC RX 258: Performed by: OBSTETRICS & GYNECOLOGY

## 2022-07-21 PROCEDURE — 80048 BASIC METABOLIC PNL TOTAL CA: CPT

## 2022-07-21 PROCEDURE — 36415 COLL VENOUS BLD VENIPUNCTURE: CPT

## 2022-07-21 PROCEDURE — G0378 HOSPITAL OBSERVATION PER HR: HCPCS

## 2022-07-21 RX ADMIN — CEFAZOLIN 2000 MG: 2 INJECTION, POWDER, FOR SOLUTION INTRAMUSCULAR; INTRAVENOUS at 03:40

## 2022-07-21 RX ADMIN — LEVOTHYROXINE SODIUM 25 MCG: 0.03 TABLET ORAL at 06:55

## 2022-07-21 RX ADMIN — ENOXAPARIN SODIUM 40 MG: 100 INJECTION SUBCUTANEOUS at 08:52

## 2022-07-21 RX ADMIN — LISINOPRIL 10 MG: 10 TABLET ORAL at 08:53

## 2022-07-21 RX ADMIN — SODIUM CHLORIDE: 9 INJECTION, SOLUTION INTRAVENOUS at 01:06

## 2022-07-21 RX ADMIN — POLYETHYLENE GLYCOL 3350 17 G: 17 POWDER, FOR SOLUTION ORAL at 08:52

## 2022-07-21 NOTE — PROGRESS NOTES
Pt alert & oriented x4, vitals stable, afebrile. Pt surgical sites CDI, galina pad with minimal drainage. Pt having no complaints of pain or nausea overnight. After rodrigues removal patient independent in room with steady gait. Pt eager to be discharged, Pt stills needs to void post catheter removal.     Pt has no immediate needs at this time. Call light and bedside table within reach.

## 2022-07-21 NOTE — CARE COORDINATION
Case Management Assessment            Discharge Note                    Date / Time of Note: 7/21/2022 9:06 AM                  Discharge Note Completed by: Nilson Grimm RN    Patient Name: Art Martin   YOB: 1960  Diagnosis: Adnexal mass [N94.89]  Elevated carcinoembryonic antigen (CEA) [R97.0]  S/P laparoscopic hysterectomy [Z90.710]   Date / Time: 7/20/2022  8:02 AM    Current PCP: NITA Chao - CNP  Clinic patient: No    Hospitalization in the last 30 days: No    Advance Directives:  Code Status: Full Code  [place] DNR form completed and on chart: Not Indicated    Financial:  Payor: Eva Kamara / Plan: Gurpreet Lindsay / Product Type: *No Product type* /      Pharmacy:    [address] [phone] Laurette Cheadle [phone]  [address]  Phone: [phone] Fax: [phone]      Assistance purchasing medications?:    Assistance provided by Case Management: None at this time    Does patient want to participate in local refill/ meds to beds program?: Yes    Meds To Beds General Rules:  1. Can ONLY be done Monday- Friday between 8:30am-5pm  2. Prescription(s) must be in pharmacy by 3pm to be filled same day  3. Copy of patient's insurance/ prescription drug card and patient face sheet must be sent along with the prescription(s)  4. Cost of Rx cannot be added to hospital bill. If financial assistance is needed, please contact unit  or ;  or  CANNOT provide pharmacy voucher for patients co-pays  5.  Patients can then  the prescription on their way out of the hospital at discharge, or pharmacy can deliver to the bedside if staff is available. (payment due at time of pick-up or delivery - cash, check, or card accepted)     Able to afford home medications/ co-pay costs: Yes    ADLS:  Current PT AM-PAC Score:   /24  Current OT AM-PAC Score: /24      DISCHARGE Disposition: Home- No Services Needed    LOC at discharge: Not Applicable  FATIMAH Completed: Not Indicated    Notification completed in HENS/PAS?:  Not Applicable    IMM Completed:   Not Indicated    Transportation:  Transportation PLAN for discharge: family   Mode of Transport: Not Applicable    Home Care:  Home Care ordered at discharge: Not [address]: Not Applicable  Orders faxed: No    Durable Medical Equipment:  DME Provider: mpem  Equipment obtained during hospitalization:     Home Oxygen and Respiratory Equipment:  Oxygen needed at discharge?: Not [address]: Not Applicable  Portable tank available for discharge?: Not Indicated    Dialysis:  Dialysis patient: No    Dialysis Center:  Not Applicable      Additional CM Notes: Pt from home will Dc home with family no needs from CM will follow up OP with surgical team     The Plan for Transition of Care is related to the following treatment goals of Adnexal mass [N94.89]  Elevated carcinoembryonic antigen (CEA) [R97.0]  S/P laparoscopic hysterectomy [Z90.710]    The Patient and/or patient representative Srinath Riddle and her family were provided with a choice of provider and agrees with the discharge plan Yes    Freedom of choice list was provided with basic dialogue that supports the patient's individualized plan of care/goals and shares the quality data associated with the providers.  Not Indicated    Care Transitions patient: No    Aimee Tang RN  [facility]  Case Management Department  Ph: [phone]  Fax: [phone]

## 2022-07-21 NOTE — DISCHARGE SUMMARY
Discharge Summary    Date: 7/21/2022  Patient Name: Anibal Hdz    YOB: 1960     Age: 57 y.o. Admit Date: 7/20/2022  Discharge Date: 7/21/2022  Discharge Condition: Good    Admission Diagnosis  Adnexal mass [N94.89]; Elevated carcinoembryonic antigen (CEA) [R97.0];S/P laparoscopic hysterectomy [Z90.710]      Discharge Diagnosis  Principal Problem:    S/P laparoscopic hysterectomy  Resolved Problems:    * No resolved hospital problems. [facility] Stay  Narrative of Hospital Course:  Underwent planned robotic hysterectomy w/ BSO for an enlarged left ovary on 7/20/2022. Surgery was uncomplicated. Observed overnight. Routine postop course. Home on POD#1    Consultants:  None    Surgeries/procedures Performed:      Treatments:    Antibiotics, Anticoagulation, Analgesia, IV Hydration and Surgery    LMW Heparin    Discharge Plan/Disposition:  Home    Hospital/Incidental Findings Requiring Follow Up:    Patient Instructions:    Diet: Regular Diet    Activity:No Lifting, Driving or Strenuous Excercise, No Driving for 2 Weeks and No Sex for  For number of days (if applicable): 30      Other Instructions:    Provider Follow-Up:   No follow-ups on file.      Significant Diagnostic Studies:    Recent Labs:  Admission on 07/20/2022  ABO/Rh                                        Date: 07/20/2022  Value: O NEG         Status: Final  Antibody Screen                               Date: 07/20/2022  Value: NEG           Status: Final  POC Glucose                                   Date: 07/20/2022  Value: 161 (A)     Ref range: 70 - 99 mg/dl      Status: Final  Performed on                                  Date: 07/20/2022  Value: ACCU-CHEK     Status: Final  POC Glucose                                   Date: 07/20/2022  Value: 145 (A)     Ref range: 70 - 99 mg/dl      Status: Final  Performed on                                  Date: 07/20/2022  Value: ACCU-CHEK     Status: Final  POC Glucose Date: 07/20/2022  Value: 142 (A)     Ref range: 70 - 99 mg/dl      Status: Final  Performed on                                  Date: 07/20/2022  Value: ACCU-CHEK     Status: Final  POC Glucose                                   Date: 07/20/2022  Value: 381 (A)     Ref range: 70 - 99 mg/dl      Status: Final  Performed on                                  Date: 07/20/2022  Value: ACCU-CHEK     Status: Final  POC Glucose                                   Date: 07/21/2022  Value: 158 (A)     Ref range: 70 - 99 mg/dl      Status: Final  Performed on                                  Date: 07/21/2022  Value: ACCU-CHEK     Status: Final  ------------    Radiology last 7 days:  No results found. Pending Labs     Order Current Status    Cytology, Non-Gyn Collected (07/20/22 1213)    POCT glucose Collected (07/20/22 1832)    Surgical Pathology Collected (07/20/22 1239)    Cytology, Non-Gyn In process        Discharge Medications    Current Discharge Medication List    START taking these medications    !! docusate sodium (COLACE) 100 MG capsule  Take 1 capsule by mouth in the morning and 1 capsule before bedtime. Qty: 60 capsule Refills: 1    ibuprofen (ADVIL;MOTRIN) 600 MG tablet  Take 1 tablet by mouth every 6 hours as needed for Pain  Qty: 30 tablet Refills: 1    oxyCODONE (ROXICODONE) 5 MG immediate release tablet  Take 1 tablet by mouth in the morning and 1 tablet in the evening. Do all this for 5 days. Intended supply: 5 days. Take lowest dose possible to manage pain. Qty: 10 tablet Refills: 0  Comments: Reduce doses taken as pain becomes manageable  Associated Diagnoses:S/P laparoscopic hysterectomy    ondansetron (ZOFRAN) 4 MG tablet  Take 1 tablet by mouth every 8 hours as needed for Nausea or Vomiting  Qty: 20 tablet Refills: 0    !! - Potential duplicate medications found. Please discuss with provider.           Current Discharge Medication List        Current Discharge Medication List    CONTINUE these medications which have NOT CHANGED    Multiple Vitamins-Minerals (THERAPEUTIC MULTIVITAMIN-MINERALS) tablet  Take 1 tablet by mouth in the morning. blood glucose test strips (ONETOUCH ULTRA) strip  TEST BLOOD SUGAR TWICE DAILY AS DIRECTED  Qty: 100 strip Refills: 3  Associated Diagnoses:Diabetes mellitus type 2 in obese (HCC)    dapagliflozin (FARXIGA) 10 MG tablet  TAKE 1 TABLET BY MOUTH EVERY MORNING  Qty: 90 tablet Refills: 2  Associated Diagnoses:Diabetes mellitus type 2 in obese ([facility])    Insulin Glargine, 2 Unit Dial, (TOUJEO MAX SOLOSTAR) 300 UNIT/ML SOPN  Inject 50 Units into the skin nightly  Qty: 10 pen Refills: 2  Associated Diagnoses:Diabetes mellitus type 2 in obese ([facility])    Semaglutide,0.25 or 0.5MG/DOS, (OZEMPIC, 0.25 OR 0.5 MG/DOSE,) 2 MG/1.5ML SOPN  INJECT 0.5MG INTO THE SKIN ONCE WEEKLY  Qty: 3 pen Refills: 3  Associated Diagnoses:Diabetes mellitus type 2 in obese ([facility] [address] Class 2 severe obesity with serious comorbidity in adult, unspecified BMI, unspecified obesity type ([facility])    lisinopril (PRINIVIL;ZESTRIL) 10 MG tablet  Take 10 mg by mouth daily     !! Insulin Pen Needle 32G X 4 MM MISC  1 each by Does not apply route daily  Qty: 100 each Refills: 3    !! Insulin Pen Needle (B-D UF III MINI PEN NEEDLES) 31G X 5 MM MISC  Use for toujeo 3 times daily plus with ozempic once a week  Qty: 100 each Refills: 2    levothyroxine (SYNTHROID) 25 MCG tablet  Take 1 tablet by mouth Daily  Qty: 30 tablet Refills: 3  Associated Diagnoses:Hypothyroidism due to Hashimoto's thyroiditis    DULoxetine (CYMBALTA) 30 MG extended release capsule  Take 30 mg by mouth nightly     atorvastatin (LIPITOR) 40 MG tablet  Take 40 mg by mouth nightly     loratadine (CLARITIN) 10 MG capsule  Take 10 mg by mouth daily    !! docusate sodium (COLACE) 100 MG capsule  Take 100 mg by mouth nightly     !! - Potential duplicate medications found. Please discuss with provider.           Current Discharge Medication List        Time Spent on Discharge:  20 minutes were spent in patient examination, evaluation, counseling as well as medication reconciliation, prescriptions for required medications, discharge plan, and follow up.     Electronically signed by Fredy Enrique DO on 7/21/22 at 8:27 AM EDT

## 2022-07-21 NOTE — PROGRESS NOTES
Progress Note     Name: Devang Santamaria Room: 7606/2898-33   YOB: 1960 MRN: [id number]   Sex: female CSN: [id number]    LOS: 0   PCP: NITA Alcaraz CNP   Attending: Jade Paulino DO Admitting: Jade Paulino DO        Subjective:   Doing very well this am. No complaints. Desires dc home  Annie diet. Voiding. Denies pain    Physical Exam:   /79   Pulse 60   Temp 97.7 °F (36.5 °C) (Oral)   Resp 17   Ht 5' 3\" (1.6 m)   Wt 181 lb 12.8 oz (82.5 kg)   SpO2 97%   BMI 32.20 kg/m²     Gen: AAO  Resp: CTAB  CV: RRR  Abd: +BS, soft, nondistended, appropriately tender  Inc: c/d/i, no erythema  Ext: nontender, no evidence of DVT       Allergies   Allergen Reactions    Metformin And Related Diarrhea    Travoprost      Eye pain, deep red       Medications:   Reviewed    Diagnostic:   Reviewed    Labs:   reviewed  No results for input(s): WBC, HGB, HCT, PLT in the last 72 hours. No results for input(s): NA, K, CL, CO2, BUN, CREATININE, CALCIUM, PHOS in the last 72 hours. Invalid input(s): MAGNES  No results for input(s): AST, ALT, BILIDIR, BILITOT, ALKPHOS in the last 72 hours. No results for input(s): INR in the last 72 hours. No results for input(s):  in the last 72 hours. Assessment:     Patient Active Problem List   Diagnosis Code    Back arthralgia M54.9    Hypothyroidism due to Hashimoto's thyroiditis E03.8, E06.3    Diabetes mellitus type 2 in obese (HCC) E11.69, E66.9    Class 2 obesity in adult E66.9    Right renal mass N28.89    S/P laparoscopic hysterectomy Z90.710       Impression/Plan:   POD#1  LESLI MAYNARD    PLAN:  Surgery and findings reviewed. All questions answered  Awaiting final path    Awaiting am labs  Plan to dc home once labs reviewed    DC instructions, call parameters, restrictions and precautions all reviewed. All questions answered. Activity: No heavy lifting (greater than 10-15 lbs), Driving, or Strenuous exercise for 2 weeks.  Nothing in the vagina (no sex, douching or tampon use, etc) for 6 weeks  Diet: regular diet  Wound Care: keep wound clean and dry. May shower or bathe. No hot tubs or swimming pools for 6 weeks. Follow-up with Dr. Ernie Olson in 2 weeks. Call the office to schedule your follow up appointment if not already confirmed.            [place]on Rise [facility]  Gynecologic Oncology  [facility]  [phone]  7/21/2022  8:24 AM

## 2022-07-21 NOTE — PROGRESS NOTES
Patient A&O x4. VSS. Patient urinated 200mL into hat this morning. Discharge order received. Patient informed of discharge order. Discharge instructions reviewed with patient and sister. Copy of discharge instructions given to patient. Signed prescriptions x zofran, ibuprofen, colace, and oxycodone given to patient. Patient and sister verbalized understanding, denies needs or questions at this time. IV removed. All patient belongings packed and sent with patient upon discharge. Patient left in car to home with sister.     Electronically signed by Shaista Beltrán RN on 7/21/2022 at 10:52 AM

## 2023-01-19 ENCOUNTER — HOSPITAL ENCOUNTER (OUTPATIENT)
Age: 63
Discharge: HOME | End: 2023-01-19
Payer: MEDICARE

## 2023-01-19 ENCOUNTER — HOSPITAL ENCOUNTER (OUTPATIENT)
Dept: GENERAL RADIOLOGY | Age: 63
Discharge: HOME | End: 2023-01-19
Payer: MEDICARE

## 2023-01-19 DIAGNOSIS — M25.561 RIGHT KNEE PAIN, UNSPECIFIED CHRONICITY: ICD-10-CM

## 2023-01-19 PROCEDURE — 73562 X-RAY EXAM OF KNEE 3: CPT

## 2023-04-28 ENCOUNTER — HOSPITAL ENCOUNTER (OUTPATIENT)
Dept: GENERAL RADIOLOGY | Age: 63
Discharge: HOME | End: 2023-04-28
Payer: MEDICARE

## 2023-04-28 ENCOUNTER — HOSPITAL ENCOUNTER (OUTPATIENT)
Age: 63
End: 2023-04-28
Payer: MEDICARE

## 2023-04-28 ENCOUNTER — HOSPITAL ENCOUNTER (OUTPATIENT)
Dept: CT IMAGING | Age: 63
Discharge: HOME | End: 2023-04-28
Payer: MEDICARE

## 2023-04-28 ENCOUNTER — HOSPITAL ENCOUNTER (OUTPATIENT)
Age: 63
Discharge: HOME | End: 2023-04-28
Payer: MEDICARE

## 2023-04-28 DIAGNOSIS — N28.1 ACQUIRED CYST OF KIDNEY: ICD-10-CM

## 2023-04-28 DIAGNOSIS — C64.1 MALIGNANT NEOPLASM OF RIGHT KIDNEY, EXCEPT RENAL PELVIS (HCC): ICD-10-CM

## 2023-04-28 DIAGNOSIS — N28.1 CYST OF KIDNEY, ACQUIRED: ICD-10-CM

## 2023-04-28 LAB
CREAT SERPL-MCNC: 0.8 MG/DL (ref 0.6–1.2)
GFR SERPLBLD CREATININE-BSD FMLA CKD-EPI: >60 ML/MIN/{1.73_M2}

## 2023-04-28 PROCEDURE — 82565 ASSAY OF CREATININE: CPT

## 2023-04-28 PROCEDURE — 6360000004 HC RX CONTRAST MEDICATION: Performed by: UROLOGY

## 2023-04-28 PROCEDURE — 74177 CT ABD & PELVIS W/CONTRAST: CPT

## 2023-04-28 PROCEDURE — 71046 X-RAY EXAM CHEST 2 VIEWS: CPT

## 2023-04-28 RX ADMIN — IOPAMIDOL 75 ML: 755 INJECTION, SOLUTION INTRAVENOUS at 09:32

## 2023-05-22 RX ORDER — PEN NEEDLE, DIABETIC 32GX 5/32"
NEEDLE, DISPOSABLE MISCELLANEOUS
Qty: 100 EACH | Refills: 3 | OUTPATIENT
Start: 2023-05-22

## 2023-06-27 ENCOUNTER — OFFICE VISIT (OUTPATIENT)
Dept: OBGYN CLINIC | Age: 63
End: 2023-06-27

## 2023-06-27 VITALS
DIASTOLIC BLOOD PRESSURE: 86 MMHG | TEMPERATURE: 97.1 F | WEIGHT: 180.2 LBS | HEART RATE: 77 BPM | OXYGEN SATURATION: 96 % | BODY MASS INDEX: 31.93 KG/M2 | HEIGHT: 63 IN | SYSTOLIC BLOOD PRESSURE: 124 MMHG

## 2023-06-27 DIAGNOSIS — E11.69 DIABETES MELLITUS TYPE 2 IN OBESE (HCC): ICD-10-CM

## 2023-06-27 DIAGNOSIS — Z01.419 WOMEN'S ANNUAL ROUTINE GYNECOLOGICAL EXAMINATION: Primary | ICD-10-CM

## 2023-06-27 DIAGNOSIS — E66.9 DIABETES MELLITUS TYPE 2 IN OBESE (HCC): ICD-10-CM

## 2023-06-27 DIAGNOSIS — Z12.31 BREAST CANCER SCREENING BY MAMMOGRAM: ICD-10-CM

## 2023-06-27 DIAGNOSIS — R92.8 ABNORMAL MAMMOGRAM: ICD-10-CM

## 2023-06-27 DIAGNOSIS — E66.01 CLASS 2 SEVERE OBESITY WITH SERIOUS COMORBIDITY IN ADULT, UNSPECIFIED BMI, UNSPECIFIED OBESITY TYPE (HCC): ICD-10-CM

## 2023-06-27 DIAGNOSIS — Z12.39 SCREENING BREAST EXAMINATION: ICD-10-CM

## 2023-06-27 DIAGNOSIS — Z90.710 S/P TOTAL HYSTERECTOMY: ICD-10-CM

## 2023-06-27 DIAGNOSIS — Z90.722 S/P BSO (BILATERAL SALPINGO-OOPHORECTOMY): ICD-10-CM

## 2023-06-27 SDOH — ECONOMIC STABILITY: INCOME INSECURITY: HOW HARD IS IT FOR YOU TO PAY FOR THE VERY BASICS LIKE FOOD, HOUSING, MEDICAL CARE, AND HEATING?: NOT HARD AT ALL

## 2023-06-27 SDOH — ECONOMIC STABILITY: FOOD INSECURITY: WITHIN THE PAST 12 MONTHS, YOU WORRIED THAT YOUR FOOD WOULD RUN OUT BEFORE YOU GOT MONEY TO BUY MORE.: NEVER TRUE

## 2023-06-27 SDOH — ECONOMIC STABILITY: HOUSING INSECURITY
IN THE LAST 12 MONTHS, WAS THERE A TIME WHEN YOU DID NOT HAVE A STEADY PLACE TO SLEEP OR SLEPT IN A SHELTER (INCLUDING NOW)?: NO

## 2023-06-27 SDOH — ECONOMIC STABILITY: FOOD INSECURITY: WITHIN THE PAST 12 MONTHS, THE FOOD YOU BOUGHT JUST DIDN'T LAST AND YOU DIDN'T HAVE MONEY TO GET MORE.: NEVER TRUE

## 2023-06-27 ASSESSMENT — PATIENT HEALTH QUESTIONNAIRE - PHQ9
SUM OF ALL RESPONSES TO PHQ QUESTIONS 1-9: 0
SUM OF ALL RESPONSES TO PHQ9 QUESTIONS 1 & 2: 0
SUM OF ALL RESPONSES TO PHQ QUESTIONS 1-9: 0
2. FEELING DOWN, DEPRESSED OR HOPELESS: 0
SUM OF ALL RESPONSES TO PHQ QUESTIONS 1-9: 0
1. LITTLE INTEREST OR PLEASURE IN DOING THINGS: 0
SUM OF ALL RESPONSES TO PHQ QUESTIONS 1-9: 0

## 2023-07-07 ENCOUNTER — HOSPITAL ENCOUNTER (OUTPATIENT)
Dept: MAMMOGRAPHY | Age: 63
Discharge: HOME | End: 2023-07-07
Attending: OBSTETRICS & GYNECOLOGY
Payer: MEDICARE

## 2023-07-07 DIAGNOSIS — Z12.31 BREAST CANCER SCREENING BY MAMMOGRAM: ICD-10-CM

## 2023-07-07 PROCEDURE — 77063 BREAST TOMOSYNTHESIS BI: CPT

## 2024-07-08 ENCOUNTER — HOSPITAL ENCOUNTER (OUTPATIENT)
Dept: MAMMOGRAPHY | Age: 64
Discharge: HOME | End: 2024-07-08
Payer: MEDICARE

## 2024-07-08 DIAGNOSIS — Z12.39 SCREENING BREAST EXAMINATION: ICD-10-CM

## 2024-07-08 PROCEDURE — 77063 BREAST TOMOSYNTHESIS BI: CPT

## 2024-07-31 ENCOUNTER — HOSPITAL ENCOUNTER (OUTPATIENT)
Dept: MAMMOGRAPHY | Age: 64
Discharge: HOME | End: 2024-07-31
Attending: RADIOLOGY
Payer: MEDICARE

## 2024-07-31 ENCOUNTER — HOSPITAL ENCOUNTER (OUTPATIENT)
Dept: ULTRASOUND IMAGING | Age: 64
Discharge: HOME | End: 2024-07-31
Attending: RADIOLOGY
Payer: MEDICARE

## 2024-07-31 DIAGNOSIS — R92.8 ABNORMAL MAMMOGRAM: ICD-10-CM

## 2024-07-31 DIAGNOSIS — R92.8 ABNORMAL MAMMOGRAM OF RIGHT BREAST: ICD-10-CM

## 2024-07-31 PROCEDURE — G0279 TOMOSYNTHESIS, MAMMO: HCPCS

## (undated) DEVICE — Device

## (undated) DEVICE — TROCAR: Brand: KII FIOS FIRST ENTRY

## (undated) DEVICE — SCISSORS SURG DIA8MM MPLR CRV ENDOWRIST

## (undated) DEVICE — GLOVE ORANGE PI 7 1/2   MSG9075

## (undated) DEVICE — GLOVE ORANGE PI 7   MSG9070

## (undated) DEVICE — PAD MATERNITY CURITY ADH STRIP DISP

## (undated) DEVICE — RENTAL ULTRASOUND BK CASE

## (undated) DEVICE — SUTURE PERMA-HAND SZ 2-0 L30IN NONABSORBABLE BLK L26MM SH K833H

## (undated) DEVICE — NEEDLE HYPO 22GA L1.5IN BLK POLYPR HUB S STL REG BVL STR

## (undated) DEVICE — SUTURE MCRYL + SZ 4-0 L18IN ABSRB UD L19MM PS-2 3/8 CIR MCP496G

## (undated) DEVICE — GOWN,SIRUS,POLYRNF,BRTHSLV,XL,30/CS: Brand: MEDLINE

## (undated) DEVICE — CANNULA SEAL

## (undated) DEVICE — NEEDLE INSUF L150MM DIA2MM DISP FOR PNEUMOPERI ENDOPATH

## (undated) DEVICE — 3M™ IOBAN™ 2 ANTIMICROBIAL INCISE DRAPE 6650EZ: Brand: IOBAN™ 2

## (undated) DEVICE — SOLUTION IV IRRIG WATER 1000ML POUR BRL 2F7114

## (undated) DEVICE — TOWEL,STOP FLAG GOLD N-W: Brand: MEDLINE

## (undated) DEVICE — SPONGE LAP REG 18X18IN

## (undated) DEVICE — STAPLER EXT SKIN 35 WIDE S STL STPL SQUEEZE HNDL VISISTAT

## (undated) DEVICE — RENTAL PROBE PRO ART ROBTIC ULTRASOUND

## (undated) DEVICE — MEGA SUTURECUT ND: Brand: ENDOWRIST

## (undated) DEVICE — LEGGINGS, PAIR, 33X51 XL, STERILE: Brand: MEDLINE

## (undated) DEVICE — SUTURE MCRYL SZ 4-0 L18IN ABSRB UD L19MM PS-2 3/8 CIR PRIM Y496G

## (undated) DEVICE — INSUFFLATION NEEDLE TO ESTABLISH PNEUMOPERITONEUM.: Brand: INSUFFLATION NEEDLE

## (undated) DEVICE — AIRSEAL 12 MM ACCESS PORT AND PALM GRIP OBTURATOR WITH BLADELESS OPTICAL TIP, 120 MM LENGTH: Brand: AIRSEAL

## (undated) DEVICE — COVER EQUIP STEEP TREND EZ CLN UP WTRPRF DISP FOR STD SZ

## (undated) DEVICE — RESERVOIR,SUCTION,100CC,SILICONE: Brand: MEDLINE

## (undated) DEVICE — ARM DRAPE

## (undated) DEVICE — APPLICATOR MEDICATED 26 CC SOLUTION HI LT ORNG CHLORAPREP

## (undated) DEVICE — PUMP SUC IRR TBNG L10FT W/ HNDPC ASSEMB STRYKEFLOW 2

## (undated) DEVICE — 3M™ TEGADERM™ TRANSPARENT FILM DRESSING FRAME STYLE WITH BORDER, 1616, 4 IN X 4-3/4 IN (10 CM X 12 CM), 50/CT 4CT/CASE: Brand: 3M™ TEGADERM™

## (undated) DEVICE — SUTURE VCRL + SZ 3-0 L18IN ABSRB UD SH 1/2 CIR TAPERCUT NDL VCP864D

## (undated) DEVICE — TUBING FLTR PLUME AWAY EVAC W/ SUCT DEV DISP PUREVIEW

## (undated) DEVICE — DRAPE,UNDERBUTTOCKS,PCH,STERILE: Brand: MEDLINE

## (undated) DEVICE — TRAP SPEC COLL 40CC MUCOUS CALIB UNIV CONN FOR OPN SUCT

## (undated) DEVICE — GLOVE SURG SZ 7 L12IN FNGR THK79MIL GRN LTX FREE

## (undated) DEVICE — SOLUTION ANTIFOG VIS SYS CLEARIFY LAPSCP

## (undated) DEVICE — SOLUTION IV IRRIG POUR BRL 0.9% SODIUM CHL 2F7124

## (undated) DEVICE — SUTURE ETHBND EXCEL SZ 0 L18IN NONABSORBABLE GRN L26MM CT-2 CX27D

## (undated) DEVICE — NEEDLE SPNL L3.5IN PNK HUB S STL REG WALL FIT STYL W/ QNCKE

## (undated) DEVICE — COLUMN DRAPE

## (undated) DEVICE — NEEDLE SPNL 22GA L3.5IN BLK HUB S STL REG WALL FIT STYL W/

## (undated) DEVICE — TIP COVER ACCESSORY

## (undated) DEVICE — BLADELESS OBTURATOR: Brand: WECK VISTA

## (undated) DEVICE — TOTAL TRAY, DB, 100% SILI FOLEY, 16FR 10: Brand: MEDLINE

## (undated) DEVICE — SYSTEM SMK EVAC LAP TBNG FILTER HSNG BENT STYL PNK SEE CLR

## (undated) DEVICE — POSITIONER HD AD W4.5XH8XL9IN HIGHLY RESILIENT FOAM CMFRT

## (undated) DEVICE — APPLIER SUT CLP FOR 2-0 3-0 4-0 VCRL ABSRB SUT

## (undated) DEVICE — PROGRASP FORCEPS: Brand: ENDOWRIST

## (undated) DEVICE — PENCIL ES CRD L10FT HND SWCHING ROCK SWCH W/ EDGE COAT BLDE

## (undated) DEVICE — TISSUE RETRIEVAL SYSTEM: Brand: INZII RETRIEVAL SYSTEM

## (undated) DEVICE — MARYLAND BIPOLAR FORCEPS: Brand: ENDOWRIST

## (undated) DEVICE — ADHESIVE SKIN CLSR 0.7ML TOP DERMBND ADV

## (undated) DEVICE — SPONGE: LAP 4X18 W XR 200/CS: Brand: MEDICAL ACTION INDUSTRIES

## (undated) DEVICE — SYRINGE MED 10ML SLIP TIP BLNT FILL AND LUERLOCK DISP

## (undated) DEVICE — PREMIUM WET SKIN PREP TRAY: Brand: MEDLINE INDUSTRIES, INC.

## (undated) DEVICE — SUTURE STRATAFIX SPRL PDO 0 DBL ARMED MH MH 9 IN LEN SWEDGED SXPD2B410

## (undated) DEVICE — DRAIN WND 15FR RND HUBLESS SIL W/ 3/16IN TRCR BLAK

## (undated) DEVICE — TOTAL TRAY, 16FR 10ML SIL FOLEY, URN: Brand: MEDLINE

## (undated) DEVICE — TRI-LUMEN FILTERED TUBE SET WITH ACTIVATED CHARCOAL FILTER: Brand: AIRSEAL

## (undated) DEVICE — SUTURE VCRL SZ 0 L36IN ABSRB UD CT-1 L36MM 1/2 CIR TAPR PNT VCP946H

## (undated) DEVICE — 3M™ IOBAN™ 2 ANTIMICROBIAL INCISE DRAPE 6648EZ: Brand: IOBAN™ 2

## (undated) DEVICE — SUTURE PDS II SZ 0 L27IN ABSRB VLT L26MM CT-2 1/2 CIR Z334H

## (undated) DEVICE — VCARE SMALL, UTERINE MANIPULATOR, VAGINAL-CERVICAL-AHLUWALIA'S-RETRACTOR-ELEVATOR: Brand: VCARE

## (undated) DEVICE — 35 ML SYRINGE LUER-LOCK TIP: Brand: MONOJECT

## (undated) DEVICE — CLIP INT L POLYMER LOK LIG HEM O LOK

## (undated) DEVICE — FENESTRATED BIPOLAR FORCEPS: Brand: ENDOWRIST